# Patient Record
Sex: MALE | Race: WHITE
[De-identification: names, ages, dates, MRNs, and addresses within clinical notes are randomized per-mention and may not be internally consistent; named-entity substitution may affect disease eponyms.]

---

## 2017-03-26 ENCOUNTER — HOSPITAL ENCOUNTER (EMERGENCY)
Dept: HOSPITAL 25 - ED | Age: 62
Discharge: HOME | End: 2017-03-26
Payer: COMMERCIAL

## 2017-03-26 VITALS — SYSTOLIC BLOOD PRESSURE: 133 MMHG | DIASTOLIC BLOOD PRESSURE: 76 MMHG

## 2017-03-26 DIAGNOSIS — L03.115: Primary | ICD-10-CM

## 2017-03-26 LAB
ADD DIFF/SLIDE REVIEW?: (no result)
ANION GAP SERPL CALC-SCNC: 6 MMOL/L (ref 2–11)
BUN SERPL-MCNC: 16 MG/DL (ref 6–24)
BUN/CREAT SERPL: 21.6 (ref 8–20)
CALCIUM SERPL-MCNC: 9.3 MG/DL (ref 8.6–10.3)
CHLORIDE SERPL-SCNC: 102 MMOL/L (ref 101–111)
GLUCOSE SERPL-MCNC: 101 MG/DL (ref 70–100)
HCO3 SERPL-SCNC: 26 MMOL/L (ref 22–32)
HCT VFR BLD AUTO: 39 % (ref 42–52)
HGB BLD-MCNC: 13.2 G/DL (ref 14–18)
MCH RBC QN AUTO: 31 PG (ref 27–31)
MCHC RBC AUTO-ENTMCNC: 34 G/DL (ref 31–36)
MCV RBC AUTO: 91 FL (ref 80–94)
POTASSIUM SERPL-SCNC: 4.4 MMOL/L (ref 3.5–5)
RBC # BLD AUTO: 4.31 10^6/UL (ref 4–5.4)
SODIUM SERPL-SCNC: 134 MMOL/L (ref 133–145)
WBC # BLD AUTO: 12.2 10^3/UL (ref 3.5–10.8)

## 2017-03-26 PROCEDURE — 80048 BASIC METABOLIC PNL TOTAL CA: CPT

## 2017-03-26 PROCEDURE — 83605 ASSAY OF LACTIC ACID: CPT

## 2017-03-26 PROCEDURE — 36415 COLL VENOUS BLD VENIPUNCTURE: CPT

## 2017-03-26 PROCEDURE — 99284 EMERGENCY DEPT VISIT MOD MDM: CPT

## 2017-03-26 PROCEDURE — 85025 COMPLETE CBC W/AUTO DIFF WBC: CPT

## 2017-03-26 NOTE — ED
Lower Extremity





- HPI Summary


HPI Summary: 





62 male presents with complaints of redness, swelling, and pain of his right 

lower extremity that he noticed Monday 3/20 however got worse this past Friday 3

/24/17. Patient states he has been unable to comfortabley walk due to the pain. 

It has become hard for him to take more than 5 steps. He also complains of pain 

when hanging his leg over a chair or bed. He Denies any PMHx besides 

thyroidectomy and walking pneumonia ~1 month ago.. He denies any recent trauma 

or injury to his ankle. He has never had this before. He has been battling an 

infection on the bottom of his feet with his PCP that itches so bad he has 

scabbed wounds on he bottom of his feet. He does not know he name of the 

medication he was using on his feet. He is unsure if that caused an infection. 

Admits to recent travel back and forth to [place] in a car twice over this 

past Friday and Saturday. Denies prolonged bed rest and prior DVT. Resting 

makes the pain better. He tried taking ibuprofen without any relief. Denies 

fever/chills.





- History of Current Complaint


Chief Complaint: EDExtremityLower


Stated Complaint: RT ANKLE SWELLING/RED


Time Seen by Provider: 03/26/17 12:39


Hx Obtained From: Patient


Onset of Pain: Days


Severity Initially: Mild


Severity Currently: Mild


Pain Intensity: 2


Pain Scale Used: 0-10 Numeric


Timing: Intermittent


Location: Is Discrete @ - left lower extremity


Character Of Pain: Aching, Spasmodic, Burning


Associated Signs And Symptoms: Positive: Swelling, Redness


Aggravating Factor(s): Standing, Ambulation, Movement


Alleviating Factor(s): Rest, Elevation


Able to Bear Weight: Yes





- Risk Factors


Gout Risk Factors: Age Over 40


DVT Risk Factors: Recent Travel





- Allergies/Home Medications


Allergies/Adverse Reactions: 


 Allergies











Allergy/AdvReac Type Severity Reaction Status Date / Time


 


No Known Allergies Allergy   Verified 03/26/17 12:18











Home Medications: 


 Home Medications





Loratadine [Claritin 5 MG CHEW] 1 tab PO DAILY 03/26/17 [History Confirmed 03/26 /17]











PMH/Surg Hx/FS Hx/Imm Hx


Endocrine/Hematology History: Reports: Hx Thyroid Disease


   Denies: Hx Diabetes


Cardiovascular History: 


   Denies: Hx Hypertension


Respiratory History: 


   Denies: Hx Asthma





- Cancer History


Cancer Type, Location and Year: thyriod





- Surgical History


Surgery Procedure, Year, and Place: tonsillectomy, corrective eye surgery





- Immunization History


Immunizations Up to Date: Yes


Infectious Disease History: No


Infectious Disease History: 


   Denies: Traveled Outside the [place] in Last 30 Days





- Family History


Known Family History: Positive: Cardiac Disease





- Social History


Alcohol Use: Daily


Alcohol Amount: 5 beers a day


Substance Use Type: Reports: None


Smoking Status (MU): Never Smoked Tobacco





Review of Systems


Constitutional: Negative


Eyes: Negative


ENT: Negative


Cardiovascular: Negative


Respiratory: Negative


Gastrointestinal: Negative


Genitourinary: Negative


Positive: Arthralgia, Myalgia, Edema - LLE


Positive: Rash


Neurological: Negative


Psychological: Normal


All Other Systems Reviewed And Are Negative: Yes





Physical Exam


Triage Information Reviewed: Yes


Vital Signs On Initial Exam: 


 Initial Vitals











Temp Pulse Resp BP Pulse Ox


 


 99.5 F   105   16   143/81   100 


 


 03/26/17 12:13  03/26/17 12:13  03/26/17 12:13  03/26/17 12:13  03/26/17 12:13








tachycardia, fever noted.


Vital Signs Reviewed: Yes


Appearance: Positive: Well-Appearing, No Pain Distress, Well-Nourished


Skin: Positive: Warm, Skin Color Reflects Adequate Perfusion - < 2 second cap 

refill, Dry, Erythema @ - LLE and edematous, no discharge, skin is hot to touch 

and tight., Other - scabbed over wounds on bilateral bottom of feet. dry and 

excoriated..  Negative: Cold, Numb, Cyanosis @, Pale


Head/Face: Positive: Normal Head/Face Inspection


Eyes: Positive: Normal, Conjunctiva Clear


ENT: Positive: Normal ENT inspection, Hearing grossly normal


Neck: Positive: Supple, Nontender, No Lymphadenopathy


Respiratory/Lung Sounds: Positive: Clear to Auscultation, Breath Sounds Present


Cardiovascular: Positive: Normal, RRR, Pulses are Symmetrical in both Upper and 

Lower Extremities - 2+ pedal pulses


Abdomen Description: Positive: Nontender


Bowel Sounds: Positive: Present


Musculoskeletal: Positive: Normal, Strength/ROM Intact - sensation intact., 

Pain @ - LLE, Edema Right.  Negative: Limited @, Interruption @


Neurological: Positive: Normal, Sensory/Motor Intact, Alert, Oriented to Person 

Place, Time, CN Intact II-III, Reflexes Intact, NV Bundle Intact Distally, 

Normal Gait


Psychiatric: Positive: Normal, Affect/Mood Appropriate





Diagnostics





- Vital Signs


 Vital Signs











  Temp Pulse Resp BP Pulse Ox


 


 03/26/17 12:13  99.5 F  105  16  143/81  100














- Laboratory


Lab Results: 


 Lab Results











  03/26/17 Range/Units





  13:04 


 


WBC  12.2 H  (3.5-10.8)  10^3/ul


 


RBC  4.31  (4.0-5.4)  10^6/ul


 


Hgb  13.2 L  (14.0-18.0)  g/dl


 


Hct  39 L  (42-52)  %


 


MCV  91  (80-94)  fL


 


MCH  31  (27-31)  pg


 


MCHC  34  (31-36)  g/dl


 


RDW  14  (10.5-15)  %


 


Plt Count  324  (150-450)  10^3/ul


 


MPV  8  (7.4-10.4)  um3


 


Neut % (Auto)  69.9  (38-83)  %


 


Lymph % (Auto)  11.7 L  (25-47)  %


 


Mono % (Auto)  14.6 H  (1-9)  %


 


Eos % (Auto)  2.7  (0-6)  %


 


Baso % (Auto)  1.1  (0-2)  %


 


Absolute Neuts (auto)  8.5 H  (1.5-7.7)  10^3/ul


 


Absolute Lymphs (auto)  1.4  (1.0-4.8)  10^3/ul


 


Absolute Monos (auto)  1.8 H  (0-0.8)  10^3/ul


 


Absolute Eos (auto)  0.3  (0-0.6)  10^3/ul


 


Absolute Basos (auto)  0.1  (0-0.2)  10^3/ul


 


Absolute Nucleated RBC  0  10^3/ul


 


Nucleated RBC %  0  











Result Diagrams: 


 03/26/17 13:04





 03/26/17 13:04


Lab Statement: Any lab studies that have been ordered have been reviewed, and 

results considered in the medical decision making process.





- Radiology


  ** right ankle


Xray Interpretation: No Acute Changes - NO RADIOGRAPHICALLY APPARENT ACUTE 

ABNORMALITY OF THE RIGHT LOWER LEG OR ANKLE.


Radiology Interpretation Completed By: Radiologist





- Ultrasound


  ** No standard instances


Ultrasound Interpretation: No Acute Changes - No sonographic evidence of deep 

vein thrombosis.


Ultrasound Interpretation Completed By: Radiologist





Re-Evaluation





- Re-Evaluation


  ** First Eval


Re-Evaluation Time: 14:30


Change: Unchanged - patient was still feeling fine without pain and is in 

agreement with plan





Lower Extremity Course/Dx





- Course


Course Of Treatment: Was not in pain while laying in bed and denied pain 

management at this time. Labs obtained, slight WBC elevation. Fluids given. U/S 

and Xray of RLE negative. Appears to be suffering from cellulitis with chronic 

plantar foot wounds b/l being the possible source of infection. Given dose of 

Ancef while in ED. Continue keflex antibiotic at home x 10 days. Aware of 

worsening signs and symptoms.





- Diagnoses


Differential Diagnosis/HQI/PQRI: Positive: Cellulitis, DVT, Gout, Infection, 

Phlebitis, Septic Arthritis, Sprain, Strain


Provider Diagnoses: 


 Cellulitis of right lower extremity








- Physician Notifications


Discussed Care of Patient With: Dr Felix





Discharge





- Discharge Plan


Condition: Stable


Disposition: HOME


Prescriptions: 


Cephalexin CAP* [Keflex CAP*] 500 mg PO QID #40 cap


Patient Education Materials:  Cellulitis (ED)


Referrals: 


Session Ahsan SNYDER [Primary Care Provider] - 


Additional Instructions: 


Take prescribed antibiotics starting tomorrow for the next 10 days. Recommend 

eating greek yogurt or taking probiotic in between antibiotic doses.


Ibuprofen or Aleve to help with pain.


If redness, swelling or pain increases or fever/chills is over 104F after 48 

hours of taking medications please return or seek medical attention promptly.


You should see some improvement within 48 hours. 


Keep area clean and dry.


Discuss with your PCP and podiatry about foot condition to prevent future 

infection. 


Drink plenty of fluids.

## 2017-03-26 NOTE — RAD
INDICATION:  Erythema and pain at the right foot and distal leg



COMPARISON: None.



TECHNIQUE: 3 views of the right ankle were obtained.



FINDINGS: The bones are normal alignment. Joint spaces appear maintained. Degenerative

changes include enthesophyte formation at the calcaneal tubercle at the origin of the

plantar fascia. No fracture is seen.



IMPRESSION:  NO RADIOGRAPHICALLY APPARENT ACUTE ABNORMALITY OF THE RIGHT LOWER LEG OR

ANKLE.



If the patient's symptoms persist, follow-up imaging is recommended.

## 2017-03-26 NOTE — RAD
HISTORY: Right lower extremity pain and swelling



TECHNIQUE: Multiple transverse and longitudinal ultrasound images were obtained of the

veins of the right lower extremity using grayscale, color Doppler, and spectral Doppler

imaging with and without compression and with augmentation. 



FINDINGS:



VEINS: The common femoral vein, deep femoral vein, femoral vein and popliteal vein are

compressible throughout their course, with normal flow on color Doppler imaging and normal

response to augmentation on spectral Doppler imaging.



SOFT TISSUES: Grossly normal. No large popliteal fossa cyst was identified.



IMPRESSION: 

No sonographic evidence of deep vein thrombosis.

## 2020-03-07 ENCOUNTER — HOSPITAL ENCOUNTER (OUTPATIENT)
Dept: HOSPITAL 25 - ED | Age: 65
Setting detail: OBSERVATION
LOS: 2 days | Discharge: HOME | End: 2020-03-09
Attending: INTERNAL MEDICINE | Admitting: INTERNAL MEDICINE
Payer: SELF-PAY

## 2020-03-07 DIAGNOSIS — D64.9: ICD-10-CM

## 2020-03-07 DIAGNOSIS — J96.01: ICD-10-CM

## 2020-03-07 DIAGNOSIS — Z79.899: ICD-10-CM

## 2020-03-07 DIAGNOSIS — Z85.850: ICD-10-CM

## 2020-03-07 DIAGNOSIS — E89.0: ICD-10-CM

## 2020-03-07 DIAGNOSIS — R06.02: ICD-10-CM

## 2020-03-07 DIAGNOSIS — J18.9: ICD-10-CM

## 2020-03-07 DIAGNOSIS — A41.9: Primary | ICD-10-CM

## 2020-03-07 DIAGNOSIS — Z23: ICD-10-CM

## 2020-03-07 DIAGNOSIS — F10.10: ICD-10-CM

## 2020-03-07 DIAGNOSIS — R94.6: ICD-10-CM

## 2020-03-07 LAB
ALBUMIN SERPL BCG-MCNC: 4 G/DL (ref 3.2–5.2)
ALBUMIN/GLOB SERPL: 1.3 {RATIO} (ref 1–3)
ALP SERPL-CCNC: 117 U/L (ref 34–104)
ALT SERPL W P-5'-P-CCNC: 53 U/L (ref 7–52)
ANION GAP SERPL CALC-SCNC: 9 MMOL/L (ref 2–11)
AST SERPL-CCNC: 29 U/L (ref 13–39)
BASOPHILS # BLD AUTO: 0.1 10^3/UL (ref 0–0.2)
BUN SERPL-MCNC: 13 MG/DL (ref 6–24)
BUN/CREAT SERPL: 14.3 (ref 8–20)
CALCIUM SERPL-MCNC: 9.4 MG/DL (ref 8.6–10.3)
CHLORIDE SERPL-SCNC: 102 MMOL/L (ref 101–111)
EOSINOPHIL # BLD AUTO: 0.5 10^3/UL (ref 0–0.6)
GLOBULIN SER CALC-MCNC: 3.2 G/DL (ref 2–4)
GLUCOSE SERPL-MCNC: 97 MG/DL (ref 70–100)
HCO3 SERPL-SCNC: 25 MMOL/L (ref 22–32)
HCT VFR BLD AUTO: 38 % (ref 42–52)
HGB BLD-MCNC: 13 G/DL (ref 14–18)
LYMPHOCYTES # BLD AUTO: 0.9 10^3/UL (ref 1–4.8)
MCH RBC QN AUTO: 32 PG (ref 27–31)
MCHC RBC AUTO-ENTMCNC: 34 G/DL (ref 31–36)
MCV RBC AUTO: 94 FL (ref 80–94)
MONOCYTES # BLD AUTO: 1.2 10^3/UL (ref 0–0.8)
NEUTROPHILS # BLD AUTO: 12.9 10^3/UL (ref 1.5–7.7)
NRBC # BLD AUTO: 0 10^3/UL
NRBC BLD QL AUTO: 0
PLATELET # BLD AUTO: 482 10^3/UL (ref 150–450)
POTASSIUM SERPL-SCNC: 4.3 MMOL/L (ref 3.5–5)
PROT SERPL-MCNC: 7.2 G/DL (ref 6.4–8.9)
RBC # BLD AUTO: 4.07 10^6 /UL (ref 4.18–5.48)
SODIUM SERPL-SCNC: 136 MMOL/L (ref 135–145)
TROPONIN I SERPL-MCNC: 0.01 NG/ML (ref ?–0.03)
TSH SERPL-ACNC: 0.02 MCIU/ML (ref 0.34–5.6)
WBC # BLD AUTO: 15.7 10^3/UL (ref 3.5–10.8)

## 2020-03-07 PROCEDURE — 87205 SMEAR GRAM STAIN: CPT

## 2020-03-07 PROCEDURE — 96361 HYDRATE IV INFUSION ADD-ON: CPT

## 2020-03-07 PROCEDURE — 83880 ASSAY OF NATRIURETIC PEPTIDE: CPT

## 2020-03-07 PROCEDURE — 36415 COLL VENOUS BLD VENIPUNCTURE: CPT

## 2020-03-07 PROCEDURE — 99283 EMERGENCY DEPT VISIT LOW MDM: CPT

## 2020-03-07 PROCEDURE — 84439 ASSAY OF FREE THYROXINE: CPT

## 2020-03-07 PROCEDURE — G0378 HOSPITAL OBSERVATION PER HR: HCPCS

## 2020-03-07 PROCEDURE — 82728 ASSAY OF FERRITIN: CPT

## 2020-03-07 PROCEDURE — 82607 VITAMIN B-12: CPT

## 2020-03-07 PROCEDURE — 96372 THER/PROPH/DIAG INJ SC/IM: CPT

## 2020-03-07 PROCEDURE — 71046 X-RAY EXAM CHEST 2 VIEWS: CPT

## 2020-03-07 PROCEDURE — 82272 OCCULT BLD FECES 1-3 TESTS: CPT

## 2020-03-07 PROCEDURE — 83540 ASSAY OF IRON: CPT

## 2020-03-07 PROCEDURE — 84466 ASSAY OF TRANSFERRIN: CPT

## 2020-03-07 PROCEDURE — 82746 ASSAY OF FOLIC ACID SERUM: CPT

## 2020-03-07 PROCEDURE — G0008 ADMIN INFLUENZA VIRUS VAC: HCPCS

## 2020-03-07 PROCEDURE — 86140 C-REACTIVE PROTEIN: CPT

## 2020-03-07 PROCEDURE — 80048 BASIC METABOLIC PNL TOTAL CA: CPT

## 2020-03-07 PROCEDURE — 83550 IRON BINDING TEST: CPT

## 2020-03-07 PROCEDURE — 71260 CT THORAX DX C+: CPT

## 2020-03-07 PROCEDURE — 87899 AGENT NOS ASSAY W/OPTIC: CPT

## 2020-03-07 PROCEDURE — 90686 IIV4 VACC NO PRSV 0.5 ML IM: CPT

## 2020-03-07 PROCEDURE — 84443 ASSAY THYROID STIM HORMONE: CPT

## 2020-03-07 PROCEDURE — 84484 ASSAY OF TROPONIN QUANT: CPT

## 2020-03-07 PROCEDURE — 80053 COMPREHEN METABOLIC PANEL: CPT

## 2020-03-07 PROCEDURE — 96366 THER/PROPH/DIAG IV INF ADDON: CPT

## 2020-03-07 PROCEDURE — 96375 TX/PRO/DX INJ NEW DRUG ADDON: CPT

## 2020-03-07 PROCEDURE — 85379 FIBRIN DEGRADATION QUANT: CPT

## 2020-03-07 PROCEDURE — 96367 TX/PROPH/DG ADDL SEQ IV INF: CPT

## 2020-03-07 PROCEDURE — 83735 ASSAY OF MAGNESIUM: CPT

## 2020-03-07 PROCEDURE — 85025 COMPLETE CBC W/AUTO DIFF WBC: CPT

## 2020-03-07 PROCEDURE — 90471 IMMUNIZATION ADMIN: CPT

## 2020-03-07 PROCEDURE — 96365 THER/PROPH/DIAG IV INF INIT: CPT

## 2020-03-07 PROCEDURE — 87070 CULTURE OTHR SPECIMN AEROBIC: CPT

## 2020-03-07 PROCEDURE — 93005 ELECTROCARDIOGRAM TRACING: CPT

## 2020-03-07 RX ADMIN — GUAIFENESIN SCH MG: 600 TABLET, EXTENDED RELEASE ORAL at 20:08

## 2020-03-07 RX ADMIN — ENOXAPARIN SODIUM SCH MG: 40 INJECTION SUBCUTANEOUS at 16:23

## 2020-03-07 RX ADMIN — LEVOFLOXACIN SCH MLS/HR: 5 INJECTION, SOLUTION INTRAVENOUS at 13:29
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
## 2020-03-07 NOTE — ED
Respiratory





- HPI Summary


HPI Summary: 


64 year old M with hx pneumonia arriving via private car to [facility] complains of 

increasing shortness of breath and cough worse with exertion since mid Feb 2020. Patient started feeling sick mid Feb. He has been going to his primary 

care provider. Dx bronchitis and walking pneumonia 2/17. Prescribed 10 day 

course of antibiotics after which he was not feeling any better. He went back 

to his primary care provider who prescribed another course of antibiotics which 

he finished 2 days ago. He is still not feeling better. He is unable to take a 

full breath. Patient reports light headedness and non-radiating chest pain 

aggravated by palpation. Patient denies wheezing, pain or swelling in BLE, 

abdominal pain. He had CXR done earlier this week done and hasn't heard about 

results yet. He also had EKG done earlier this week which was normal. The 

patient rates the pain 4/10 in severity. Symptoms alleviated by nothing. 

Medications reviewed. No hx asthma, COPD, cardiac, DVT, PE. Patient is a non-

smoker. He is around his girlfriend who smokes a lot.





- History of Current Complaint


Chief Complaint: EDUpperRespComplaint


Stated Complaint: SOB/BREATHING PROBLEMS PER PT


Time Seen by Provider: 03/07/20 10:26


Hx Obtained From: Patient


Onset/Duration: Lasting Weeks - mid Feb 2020, Still Present


Timing: Constant


Current Severity: Mild


Pain Intensity: 4


Aggravating Factor(s): Exertion


Alleviating Factor(s): Nothing





- Allergy/Home Medications


Allergies/Adverse Reactions: 


 Allergies











Allergy/AdvReac Type Severity Reaction Status Date / Time


 


No Known Allergies Allergy   Verified 03/26/17 12:18











Home Medications: 


 Home Medications





Levothyroxine 150 mcg PO DAILY 01/08/15 [History Confirmed 03/07/20]











PMH/Surg Hx/FS Hx/Imm Hx


Endocrine/Hematology History: Reports: Hx Thyroid Disease


   Denies: Hx Diabetes


Cardiovascular History: 


   Denies: Hx Deep Vein Thrombosis, Hx Hypertension


Respiratory History: Reports: Hx Pneumonia


   Denies: Hx Asthma, Hx Chronic Obstructive Pulmonary Disease (COPD), Hx 

Pulmonary Edema





- Cancer History


Cancer Type, Location and Year: thyriod





- Surgical History


Surgery Procedure, Year, and Place: tonsillectomy, corrective eye surgery


Infectious Disease History: No


Infectious Disease History: 


   Denies: Traveled Outside the [place] in Last 30 Days





- Family History


Known Family History: Positive: Cardiac Disease





- Social History


Alcohol Use: Daily


Alcohol Amount: 5 beers a day


Substance Use Type: Reports: None


Hx Tobacco Use: No


Smoking Status (MU): Never Smoked Tobacco





Review of Systems


Positive: Chest Pain


Respiratory: Negative - wheezing


Positive: Shortness Of Breath, Cough


Negative: Abdominal Pain


Musculoskeletal: Negative - pain or swelling in BLE


Neurological/Mental Status: Other - light headedness


All Other Systems Reviewed And Are Negative: Yes





Physical Exam





- Summary


Physical Exam Summary: 





Constitutional: Well-developed, Well-nourished, Alert. (-) Distressed


Skin: Warm, Dry


HENT: Normocephalic; Atraumatic


Eyes: Conjunctiva normal


Neck: Musculoskeletal ROM normal neck. (-) JVD, (-) Stridor, (-) Tracheal 

deviation


Cardio: Rhythm regular, rate normal, Heart sounds normal; Intact distal pulses; 

The pedal pulses are 2+ and symmetric. Radial pulses are 2+ and symmetric. (-) 

Murmur


Pulmonary/Chest wall: Effort normal. (-) Respiratory distress, (-) Wheezes, (-) 

Rales


Abd: Soft, (-) tenderness, (-) Distension, (-) Guarding, (-) Rebound


Musculoskeletal: (-) Edema


Lymph: (-) Cervical adenopathy


Neuro: Alert, Oriented x3


Psych: Mood and affect Normal





Triage Information Reviewed: Yes


Vital Signs On Initial Exam: 


 Initial Vitals











Temp Pulse Resp BP Pulse Ox


 


 98.7 F   94   20   126/91   92 


 


 03/07/20 10:19  03/07/20 10:19  03/07/20 10:19  03/07/20 10:19  03/07/20 10:19











Vital Signs Reviewed: Yes





Procedures





- Sedation


Patient Received Moderate/Deep Sedation with Procedure: No





Diagnostics





- Vital Signs


 Vital Signs











  Temp Pulse Resp BP Pulse Ox


 


 03/07/20 10:19  98.7 F  94  20  126/91  92














- Laboratory


Result Diagrams: 


 03/07/20 10:46





 03/07/20 10:46


Lab Statement: Any lab studies that have been ordered have been reviewed, and 

results considered in the medical decision making process.





- Radiology


  ** CXR


Radiology Interpretation Completed By: Radiologist - IMPRESSION: #.  Suboptimal 

inspiration with resulting crowding of the pulmonary markings based on 

comparison with the prior exam. No compelling evidence for pneumonia. ED 

physician has reviewed this imaging report.





- CT


  ** CHEST


CT Interpretation Completed By: Radiologist - IMPRESSION:  #. The constellation 

of findings favors mild bronchopneumonia.  #.Upper normal 1.0 cm short axis 

precarinal lymph node and similar RIGHT suprahilar lymph node enlargement 

compared with the 2015 CT. ED physician has reviewed this imaging report.





- EKG


  ** 1043


Cardiac Rate: NL - 84 BPM


EKG Rhythm: Sinus Rhythm


Summary of EKG Findings: No ischemic changes. ED physician has reviewed and 

interpreted this EKG.





Disposition





- Course


Course Of Treatment: 65 y/o M with recent dx bronchitis and pneumonia presents 

with increasing shortness of breath and cough worse with exertion since mid Feb 2020. He has been going to his primary care provider who has prescribed him 2 

courses of antibiotics. Patient isn't feeling better and is unable to take a 

full breath. He reports light headedness and non-radiating chest pain 

aggravated by palpation. He had CXR and EKG done earlier this week. Patient is 

a non-smoker. He is around his girlfriend who smokes a lot.  Physical exam 

unremarkable.  Bloodwork results with no significant abnormalities except for 

WBC 15.7, RBC 4.07, Hgb 13.0, MCH 32, platelet 482, absolute neuts 12.9, 

absolute lymphs 0.9, absolute monos 1.2, ALT 53, alkaline phosphatase 117.  An 

EKG shows NSR 84 BPM and no ischemic changes.  CXR shows Suboptimal inspiration 

with resulting crowding of the pulmonary markings based on comparison with the 

prior exam. No compelling evidence for pneumonia.  Chest CT shows #. The 

constellation of findings favors mild bronchopneumonia.  #.Upper normal 1.0 cm 

short axis precarinal lymph node and similar RIGHT suprahilar lymph node 

enlargement compared with the 2015 CT.  In the ED course, the patient was given 

Duoneb, ceftriaxone.  Spoke to Dr. Leon. She agrees to admit patient.





- Diagnoses


Provider Diagnoses: 


 Bronchopneumonia, Hypoxia








- Physician Notifications


Discussed Care Of Patient With: Mallory Leon


Time Discussed With Above Provider: 13:10


Instructed by Provider To: Admit As Inpatient





Discharge ED





- Sign-Out/Discharge


Documenting (check all that apply): Patient Departure





- Discharge Plan


Condition: Stable


Disposition: ADMITTED TO [facility]


Referrals: 


Ahsan Townsend PA [Primary Care Provider] - 





- Billing Disposition and Condition


Condition: STABLE


Disposition: Admitted to [facility]





- Attestation Statements


Document Initiated by Chiomaibomid: Yes


Documenting Scribe: Fany Sam


Provider For Whom Dana is Documenting (Include Credential): Eduin White DO


Scribe Attestation: 


IFany, scribed for Eduin White DO on 03/07/20 at 1347. 


Scribe Documentation Reviewed: Yes


Provider Attestation: 


The documentation as recorded by the chiomaibFany anne accurately reflects 

the service I personally performed and the decisions made by me, Eduin White DO


Status of Scribe Document: Viewed

## 2020-03-08 LAB
ANION GAP SERPL CALC-SCNC: 7 MMOL/L (ref 2–11)
BASOPHILS # BLD AUTO: 0 10^3/UL (ref 0–0.2)
BUN SERPL-MCNC: 13 MG/DL (ref 6–24)
BUN/CREAT SERPL: 16 (ref 8–20)
CALCIUM SERPL-MCNC: 8.7 MG/DL (ref 8.6–10.3)
CHLORIDE SERPL-SCNC: 102 MMOL/L (ref 101–111)
EOSINOPHIL # BLD AUTO: 0.7 10^3/UL (ref 0–0.6)
FERRITIN SERPL IA-MCNC: 324.5 NG/ML (ref 24–336)
FOLATE SERPL-MCNC: 14.38 NG/ML (ref 3.99–?)
GLUCOSE SERPL-MCNC: 110 MG/DL (ref 70–100)
HCO3 SERPL-SCNC: 24 MMOL/L (ref 22–32)
HCT VFR BLD AUTO: 34 % (ref 42–52)
HGB BLD-MCNC: 11.7 G/DL (ref 14–18)
IRON SERPL-MCNC: 64 UG/DL (ref 50–212)
LYMPHOCYTES # BLD AUTO: 1 10^3/UL (ref 1–4.8)
MAGNESIUM SERPL-MCNC: 2 MG/DL (ref 1.9–2.7)
MCH RBC QN AUTO: 32 PG (ref 27–31)
MCHC RBC AUTO-ENTMCNC: 34 G/DL (ref 31–36)
MCV RBC AUTO: 93 FL (ref 80–94)
MONOCYTES # BLD AUTO: 0.9 10^3/UL (ref 0–0.8)
NEUTROPHILS # BLD AUTO: 5.8 10^3/UL (ref 1.5–7.7)
NRBC # BLD AUTO: 0 10^3/UL
NRBC BLD QL AUTO: 0
PLATELET # BLD AUTO: 406 10^3/UL (ref 150–450)
POTASSIUM SERPL-SCNC: 4 MMOL/L (ref 3.5–5)
RBC # BLD AUTO: 3.67 10^6 /UL (ref 4.18–5.48)
SODIUM SERPL-SCNC: 133 MMOL/L (ref 135–145)
TIBC SERPL-MCNC: 256 MCG/DL (ref 250–450)
TRANSFERRIN SERPL-MCNC: 183 MG/DL (ref 203–362)
WBC # BLD AUTO: 8.5 10^3/UL (ref 3.5–10.8)

## 2020-03-08 RX ADMIN — LEVOFLOXACIN SCH MLS/HR: 5 INJECTION, SOLUTION INTRAVENOUS at 13:29

## 2020-03-08 RX ADMIN — THERA TABS SCH TAB: TAB at 09:22

## 2020-03-08 RX ADMIN — FOLIC ACID SCH MG: 1 TABLET ORAL at 09:22

## 2020-03-08 RX ADMIN — GUAIFENESIN SCH MG: 600 TABLET, EXTENDED RELEASE ORAL at 07:19

## 2020-03-08 RX ADMIN — ENOXAPARIN SODIUM SCH MG: 40 INJECTION SUBCUTANEOUS at 13:31

## 2020-03-08 RX ADMIN — GUAIFENESIN SCH MG: 600 TABLET, EXTENDED RELEASE ORAL at 20:10

## 2020-03-08 NOTE — PN
Subjective


Date of Service: 03/08/20


Interval History: 





Patient is feeling better than he did yesterday. Still mildly short of breath 

at rest. Frequently coughing, bringing up thick dark brown sputum. Using 

incentive spirometer. Titrated from 5 to 3.5L via nasal cannula. Reports 

drinking 4-6 alcoholic drinks a day. Denies lightheadedness, dizziness, chest 

pain, palpitations, abdominal pain, nausea, vomiting, issues moving bowel or 

bladder. 


Family History: Unchanged from Admission


Social History: Unchanged from Admission


Past Medical History: Unchanged from Admission





Objective


Active Medications: 








Acetaminophen (Tylenol Tab*)  650 mg PO Q6H PRN


   PRN Reason: MILD PAIN or TEMP > 100.4


Albuterol/Ipratropium (Duoneb (Albuterol 2.5 Mg/Ipratropium 0.5 Mg))  1 neb INH 

Q6H PRN


   PRN Reason: SOB/WHEEZING


Benzonatate (Tessalon Cap*)  100 mg PO BID PRN


   PRN Reason: COUGH


   Last Admin: 03/08/20 06:00 Dose:  100 mg


Enoxaparin Sodium (Lovenox(*))  40 mg SUBCUT Q24H [facility]


   Last Admin: 03/07/20 16:23 Dose:  40 mg


Guaifenesin (Mucinex*)  1,200 mg PO BID [facility]


   Last Admin: 03/08/20 07:19 Dose:  1,200 mg


Levofloxacin/Dextrose (Levaquin 750 Mg Ivpremix(*))  750 mg in 150 mls @ 100 mls

/hr IVPB Q24H [facility]; Protocol


   Last Admin: 03/07/20 13:29 Dose:  100 mls/hr


Sodium Chloride (Ns 0.9% 1000 Ml**)  1,000 mls @ 100 mls/hr IV PER RATE [facility]


   Stop: 03/08/20 18:29


Influenza Virus Vaccine (Fluarix Quad [id number] Syr)  0.5 ml IM .ONCE ONE


   Stop: 03/08/20 09:01


   Last Admin: 03/08/20 07:19 Dose:  0.5 ml


Levothyroxine Sodium (Synthroid Tab*)  100 mcg PO 0600 [facility]


Melatonin (Melatonin)  3 mg PO BEDTIME PRN


   PRN Reason: INSOMNIA


   Last Admin: 03/07/20 20:08 Dose:  3 mg


Ondansetron HCl (Zofran Inj*)  4 mg IV Q6H PRN


   PRN Reason: NAUSEA








 Vital Signs - 8 hr











  03/08/20 03/08/20





  03:15 07:19


 


Temperature 99.7 F 99.4 F


 


Pulse Rate 78 82


 


Respiratory 16 20





Rate  


 


Blood Pressure 92/55 102/62





(mmHg)  


 


O2 Sat by Pulse 95 96





Oximetry  











Oxygen Devices in Use Now: Nasal Cannula - 3.5L, OxyMask


Appearance: This is a well developed gentleman seen sitting up in bed, no acute 

distress.


Eyes: No Scleral Icterus, PERRLA


Ears/Nose/Mouth/Throat: NL Teeth, Lips, Gums, Clear Oropharnyx, Mucous 

Membranes Moist


Neck: NL Appearance and Movements; NL JVP, Trachea Midline


Respiratory: Symmetrical Chest Expansion and Respiratory Effort, - - Fine 

crackles to right lower and middle lobes.


Cardiovascular: NL Sounds; No Murmurs; No JVD, RRR, No Edema


Abdominal: NL Sounds; No Tenderness; No Distention


Lymphatic: No Cervical Adenopathy


Extremities: No Edema, No Clubbing, Cyanosis


Skin: No Rash or Ulcers, No Nodules or Sclerosis


Neurological: Alert and Oriented x 3


Lines/Tubes/Other Access: Clean, Dry and Intact Peripheral IV


Result Diagrams: 


 03/08/20 05:25





 03/08/20 05:25


Microbiology and Other Data: 


 Microbiology











 03/07/20 18:00 Gram Stain - Final





 Sputum 


 


 03/07/20 18:00 Legionella Urinary Antigen - Final





 Urine    Negative Legionella Antigen





 Streptococcus pneumoniae Ag Screen - Final





    Negative S. pneumo Antigen














Assess/Plan/Problems-Billing


Assessment: 





This is a 65yo male with a past medical history of thyroid cancer, s/p 

thyroidectomy, and ETOH abuse who was admitted 3/7/20 for acute hypoxic 

respiratory failure secondary to bacerial bronchopneumonia.





- Patient Problems


(1) Acute respiratory failure with hypoxia


Current Visit: Yes   Status: Acute   Code(s): J96.01 - ACUTE RESPIRATORY 

FAILURE WITH HYPOXIA   SNOMED Code(s): 89716568


   Comment: -Originally was on 5L via face mask. Was able to be tirated to 3.5L 

via nasal cannula this morning. Continue to titrate oxygen to keep sats above 90

%. 


-This is secondary to bronchopneumonia.   





(2) Sepsis


Current Visit: Yes   Status: Acute   Comment: -resolved.   





(3) Bronchopneumonia


Current Visit: Yes   Status: Acute   Code(s): J18.0 - BRONCHOPNEUMONIA, 

UNSPECIFIED ORGANISM   SNOMED Code(s): 205761875


   Comment: -Awaiting sputum culture. Smear shows gram positive and negative 

coccobacilli. 


-Continue levaquin. Recheck labs in am. No tendon pain.   





(4) Hypothyroid


Current Visit: Yes   Status: Acute   Code(s): E03.9 - HYPOTHYROIDISM, 

UNSPECIFIED   SNOMED Code(s): 87742626


   Comment: -Labs revealed hyperthyroidism. Home dosage of levothyroxine was 

150mcg, which has been decreased to 125mcg. Appears to be fidgety and slightly 

anxious but otherwise asymptomatic.   





(5) ETOH abuse


Current Visit: Yes   Status: Acute   Code(s): F10.10 - ALCOHOL ABUSE, 

UNCOMPLICATED   SNOMED Code(s): 88107155


   Comment: -Drinks 4-6 alcoholic drinks daily. Placed on WA protocol with 

pascual thiamine, folic acid and multivitamin.   





(6) Anemia


Current Visit: Yes   Status: Acute   Code(s): D64.9 - ANEMIA, UNSPECIFIED   

SNOMED Code(s): 836376730


   Comment: -Unsure of origin at this time, though I suspect it is related to 

his drinking. He reports waking up many morning with a nosebleed, though it is 

only a couple of spots each time. Matteo never soaked a kleenex. 


-Ordered Vit B12, folate and iron studies.   





(7) DVT prophylaxis


Current Visit: Yes   Status: Acute   Code(s): Z29.9 - ENCOUNTER FOR 

PROPHYLACTIC MEASURES, UNSPECIFIED   SNOMED Code(s): 575956101


   Comment: -Continue lovenox.   





(8) Full code status


Current Visit: Yes   Status: Acute   Code(s): Z78.9 - OTHER SPECIFIED HEALTH 

STATUS   SNOMED Code(s): 499000977


   


Status and Disposition: 





Condition: fair


Dispo: Admit OBV to [facility]


Attending: Caroline Armenta

## 2020-03-09 VITALS — DIASTOLIC BLOOD PRESSURE: 76 MMHG | SYSTOLIC BLOOD PRESSURE: 118 MMHG

## 2020-03-09 LAB
ANION GAP SERPL CALC-SCNC: 7 MMOL/L (ref 2–11)
BASOPHILS # BLD AUTO: 0 10^3/UL (ref 0–0.2)
BUN SERPL-MCNC: 11 MG/DL (ref 6–24)
BUN/CREAT SERPL: 14.5 (ref 8–20)
CALCIUM SERPL-MCNC: 8.8 MG/DL (ref 8.6–10.3)
CHLORIDE SERPL-SCNC: 106 MMOL/L (ref 101–111)
EOSINOPHIL # BLD AUTO: 0.7 10^3/UL (ref 0–0.6)
GLUCOSE SERPL-MCNC: 102 MG/DL (ref 70–100)
HCO3 SERPL-SCNC: 22 MMOL/L (ref 22–32)
HCT VFR BLD AUTO: 35 % (ref 42–52)
HGB BLD-MCNC: 12 G/DL (ref 14–18)
LYMPHOCYTES # BLD AUTO: 1 10^3/UL (ref 1–4.8)
MCH RBC QN AUTO: 32 PG (ref 27–31)
MCHC RBC AUTO-ENTMCNC: 34 G/DL (ref 31–36)
MCV RBC AUTO: 92 FL (ref 80–94)
MONOCYTES # BLD AUTO: 1.1 10^3/UL (ref 0–0.8)
NEUTROPHILS # BLD AUTO: 6.5 10^3/UL (ref 1.5–7.7)
NRBC # BLD AUTO: 0 10^3/UL
NRBC BLD QL AUTO: 0
PLATELET # BLD AUTO: 411 10^3/UL (ref 150–450)
POTASSIUM SERPL-SCNC: 4.1 MMOL/L (ref 3.5–5)
RBC # BLD AUTO: 3.77 10^6 /UL (ref 4.18–5.48)
SODIUM SERPL-SCNC: 135 MMOL/L (ref 135–145)
WBC # BLD AUTO: 9.3 10^3/UL (ref 3.5–10.8)

## 2020-03-09 RX ADMIN — THERA TABS SCH TAB: TAB at 10:08

## 2020-03-09 RX ADMIN — GUAIFENESIN SCH MG: 600 TABLET, EXTENDED RELEASE ORAL at 10:08

## 2020-03-09 RX ADMIN — FOLIC ACID SCH MG: 1 TABLET ORAL at 10:08

## 2020-03-10 NOTE — DS
CC:  CLAUDIO Hoff *

 

DISCHARGE SUMMARY:

 

DATE OF ADMISSION:  03/07/20

 

DATE OF DISCHARGE:  03/09/20

 

PRIMARY CARE PROVIDER:  CLAUDIO Hoff

 

ATTENDING PHYSICIAN:  Dr. Caroline Armenta * (dictated by DELROY Dalton).

 

PRIMARY DIAGNOSES:

1.  Acute respiratory failure with hypoxia, resolved.

2.  Sepsis, resolved.

3.  Pneumonia.

4.  Elevated free T4.

5.  Anemia suspected to be due to alcohol abuse.

 

SECONDARY DIAGNOSES:

1.  Hypothyroidism.

2.  Thyroid cancer, status post thyroidectomy resulting in hypothyroidism.

 

STUDIES WHILE IN THE HOSPITAL:

1.  Two view chest x-ray, impression:  Suboptimal inspiration with resultant 
crowding of the pulmonary markings based on comparison with the prior exam.  No 
compelling evidence for pneumonia.

2.  CT chest with contrast, impression: The constellation of findings favors 
mild bronchopneumonia, upper normal 1.0 cm short axis, precarinal lymph node 
and similar right suprahilar lymph node enlargement compared with 2015 CT.

 

DISCHARGE MEDICATIONS:  Home medications:  None.

 

New home medications:

1.  Benzonatate 100 mg p.o. b.i.d. p.r.n. cough.

2.  Folic acid 1 mg p.o. daily.

3.  Guaifenesin 1200 mg p.o. b.i.d. p.r.n. chest congestion.

4.  Levofloxacin 750 mg p.o. daily x7 days (total of 10 days of treatment).

5.  Melatonin 3 mg p.o. at bedtime p.r.n.

6.  Multivitamin/minerals 1 tab p.o. daily.

7.  Thiamine 100 mg p.o. daily.

 

Changed home medications:  

1.  Levothyroxine decreased to 125 mcg p.o. daily.

 

HISTORY OF PRESENT ILLNESS/HOSPITAL COURSE:  Mr. Llanos is a 64-year-old male 
with a past medical history of hypothyroidism, status post thyroidectomy due to 
cancer, who presented to the ER on 03/07/20 with complaints of shortness of 
breath for approximately 3 weeks.  He reports that he had seen his primary care 
provider and was prescribed amoxicillin, azithromycin and prednisone taper, 
none of which have resolved his problems.  A chest x-ray was unremarkable, but 
a CT of the chest revealed bronchopneumonia with bilateral lower lobe ground-
glass opacities.  The patient was admitted to the ER and placed on Levaquin.  
It is important to note that at admission, the patient met sepsis criteria with 
leukocytosis and tachycardia.  Again, the patient was treated with Levaquin 
daily.  He initially required oxygen and was started on 5 L of oxygen, but 
eventually weaned down to home oxygen.  He did improve throughout his stay.  At 
rest and with ambulation, the patient's oxygen saturation remained intact and 
he denied dyspnea on exertion.  By the time of discharge, the patient reports 
that he is feeling well and is eager for discharge home.  Recommendations were 
made to continue Levaquin for a total of 10 days of antibiotic therapy.

 

The patient was noted to have a low TSH at admission, which prompted free T4 
analysis, which was noted to be somewhat elevated at 1.30.  The patient's home 
dose of levothyroxine 150 mcg was decreased to 125 mcg.  He should follow with 
his primary care provider for further adjustments with recommendation to repeat 
TSH and free T4 in 4 to 6 weeks.  The patient also had a mild anemia throughout 
his stay. Iron studies, B12 and folate were obtained and were all relatively 
unremarkable. He has a normocytic anemia suspected to be due to alcoholic use, 
although the patient should follow up with his primary care provider for 
further recommendation and continued monitoring.

 

At the time of discharge, the patient denies chest pain, shortness of breath, 
wheeze, fever, chills.  He denies dyspnea on exertion or lower extremity edema.
  He does continue to have a productive cough, but notes that this is 
decreased.  He denies abdominal pain, nausea, vomiting, diarrhea, constipation, 
myalgias, arthralgias.  Mr. Llanos is stable for discharge home.

 

PHYSICAL EXAMINATION:  Vital Signs:  Temperature 98.3 temporal, heart rate 90, 
respiratory rate 21, oxygen saturation 93% on room air, blood pressure 118/76. 
General:  Mr. Llanos is a well-developed, well-nourished, middle-aged white 
male, who is sitting at the edge of bed, eating breakfast.  He appears to be in 
no acute distress.  He is pleasant, cooperative, and appropriate.  He is 
breathing comfortably on room air.  HEENT:  PERRL.  EOMI.  Nonicteric sclerae.  
Hearing is grossly intact.  Oral mucous membranes are moist.  There are no 
lesions. Oropharynx is clear.  Tongue is at midline.  Palate elevates 
symmetrically.  No cervical lymphadenopathy.  Thyroid is nonpalpable.  
Cardiovascular:  Regular rate and rhythm.  S1, S2 present.  No murmurs, rubs, 
clicks, or gallops.  There is no JVD or peripheral edema.  Pulmonary:  
Symmetrical chest expansion without use of accessory muscles.  Lungs are clear 
to auscultation bilaterally without rhonchi, wheeze, or rales.  Abdomen:  Bowel 
sounds in all quadrants, soft, nontender to palpation.  Neuro:  The patient is 
awake.  He is alert and oriented x3.  Cranial nerves II through XII are grossly 
intact.  He is able to move all of his extremities with a motor strength to 5/5 
bilaterally in upper and lower extremities.  Skin:  Clean, dry and intact 
without rash.

 

DISCHARGE PLAN:  Mr. Llanos will be discharged to home.

 

CONDITION:  Good.

 

DIET:  Resume home diet.

 

ACTIVITY:  As tolerated.

 

MEDICATIONS:

1.  Levaquin 750 mg p.o. daily at 1330 total of 10 days.

2.  Continue Tessalon Perles for cough, Mucinex for chest congestion as needed.

3.  Continue folic acid, thiamine, and multivitamin.

4.  Levothyroxine dose has been decreased, please discard old dose and pickup 
new dose from your pharmacy.

 

EDUCATION:

1.  Follow up with primary care provider in 4 to 7 days, discuss recent 
hospitalization.

2.  Recommend repeat TSH, free T4 in 4 to 6 weeks to assess levothyroxine dose 
adjustment.

3.  Return to the ER or nearest hospital if you experience any return or 
worsening of symptoms, chest pain or discomfort, shortness of breath, high 
fevers, chills, night sweats, dizziness, lightheadedness, loss of consciousness
, or any other worrisome signs or symptoms.

 

This is a summarized report of a complex medical history and hospital stay.  
For further details, please see the entire medical record.

 

TIME SPENT:  Approximately 35 minutes was spent on this discharge, greater than 
half that time was spent face-to-face with the patient discussing discharge 
plans and instructions.

 

____________________________________ DELROY CORONADO

 

[id number]/CPS #: [id number]

MTDD

## 2020-03-12 ENCOUNTER — HOSPITAL ENCOUNTER (INPATIENT)
Dept: HOSPITAL 25 - ED | Age: 65
LOS: 3 days | Discharge: HOME | DRG: 193 | End: 2020-03-15
Attending: INTERNAL MEDICINE | Admitting: INTERNAL MEDICINE
Payer: SELF-PAY

## 2020-03-12 DIAGNOSIS — Z83.3: ICD-10-CM

## 2020-03-12 DIAGNOSIS — J18.0: Primary | ICD-10-CM

## 2020-03-12 DIAGNOSIS — Z85.850: ICD-10-CM

## 2020-03-12 DIAGNOSIS — Z80.0: ICD-10-CM

## 2020-03-12 DIAGNOSIS — J96.01: ICD-10-CM

## 2020-03-12 DIAGNOSIS — F10.20: ICD-10-CM

## 2020-03-12 DIAGNOSIS — Z79.899: ICD-10-CM

## 2020-03-12 DIAGNOSIS — E87.1: ICD-10-CM

## 2020-03-12 DIAGNOSIS — E89.0: ICD-10-CM

## 2020-03-12 DIAGNOSIS — Z57.2: ICD-10-CM

## 2020-03-12 LAB
ALBUMIN SERPL BCG-MCNC: 3.5 G/DL (ref 3.2–5.2)
ALBUMIN/GLOB SERPL: 1 {RATIO} (ref 1–3)
ALP SERPL-CCNC: 112 U/L (ref 34–104)
ALT SERPL W P-5'-P-CCNC: 40 U/L (ref 7–52)
ANION GAP SERPL CALC-SCNC: 11 MMOL/L (ref 2–11)
APTT PPP: 38.4 SECONDS (ref 26–38)
AST SERPL-CCNC: 23 U/L (ref 13–39)
BASOPHILS # BLD AUTO: 0.1 10^3/UL (ref 0–0.2)
BUN SERPL-MCNC: 15 MG/DL (ref 6–24)
BUN/CREAT SERPL: 16.3 (ref 8–20)
CALCIUM SERPL-MCNC: 9.5 MG/DL (ref 8.6–10.3)
CHLORIDE SERPL-SCNC: 99 MMOL/L (ref 101–111)
EOSINOPHIL # BLD AUTO: 0.6 10^3/UL (ref 0–0.6)
GLOBULIN SER CALC-MCNC: 3.4 G/DL (ref 2–4)
GLUCOSE SERPL-MCNC: 108 MG/DL (ref 70–100)
HCO3 SERPL-SCNC: 23 MMOL/L (ref 22–32)
HCT VFR BLD AUTO: 34 % (ref 42–52)
HGB BLD-MCNC: 11.6 G/DL (ref 14–18)
INR PPP/BLD: 1.19 (ref 0.82–1.09)
LYMPHOCYTES # BLD AUTO: 2 10^3/UL (ref 1–4.8)
MCH RBC QN AUTO: 31 PG (ref 27–31)
MCHC RBC AUTO-ENTMCNC: 34 G/DL (ref 31–36)
MCV RBC AUTO: 93 FL (ref 80–94)
MONOCYTES # BLD AUTO: 1.5 10^3/UL (ref 0–0.8)
NEUTROPHILS # BLD AUTO: 9.8 10^3/UL (ref 1.5–7.7)
NRBC # BLD AUTO: 0 10^3/UL
NRBC BLD QL AUTO: 0.1
PLATELET # BLD AUTO: 405 10^3/UL (ref 150–450)
POTASSIUM SERPL-SCNC: 4 MMOL/L (ref 3.5–5)
PROT SERPL-MCNC: 6.9 G/DL (ref 6.4–8.9)
RBC # BLD AUTO: 3.71 10^6 /UL (ref 4.18–5.48)
SODIUM SERPL-SCNC: 133 MMOL/L (ref 135–145)
TROPONIN I SERPL-MCNC: 0.01 NG/ML (ref ?–0.03)
WBC # BLD AUTO: 14.1 10^3/UL (ref 3.5–10.8)

## 2020-03-12 PROCEDURE — 93306 TTE W/DOPPLER COMPLETE: CPT

## 2020-03-12 PROCEDURE — 83605 ASSAY OF LACTIC ACID: CPT

## 2020-03-12 PROCEDURE — 84484 ASSAY OF TROPONIN QUANT: CPT

## 2020-03-12 PROCEDURE — 85610 PROTHROMBIN TIME: CPT

## 2020-03-12 PROCEDURE — 81003 URINALYSIS AUTO W/O SCOPE: CPT

## 2020-03-12 PROCEDURE — 80048 BASIC METABOLIC PNL TOTAL CA: CPT

## 2020-03-12 PROCEDURE — 85025 COMPLETE CBC W/AUTO DIFF WBC: CPT

## 2020-03-12 PROCEDURE — 93005 ELECTROCARDIOGRAM TRACING: CPT

## 2020-03-12 PROCEDURE — 80053 COMPREHEN METABOLIC PANEL: CPT

## 2020-03-12 PROCEDURE — 99284 EMERGENCY DEPT VISIT MOD MDM: CPT

## 2020-03-12 PROCEDURE — 71046 X-RAY EXAM CHEST 2 VIEWS: CPT

## 2020-03-12 PROCEDURE — 36415 COLL VENOUS BLD VENIPUNCTURE: CPT

## 2020-03-12 PROCEDURE — 85730 THROMBOPLASTIN TIME PARTIAL: CPT

## 2020-03-12 PROCEDURE — 87040 BLOOD CULTURE FOR BACTERIA: CPT

## 2020-03-12 SDOH — HEALTH STABILITY - PHYSICAL HEALTH: OCCUPATIONAL EXPOSURE TO DUST: Z57.2

## 2020-03-12 NOTE — ED
Respiratory





- HPI Summary


HPI Summary: 


64 year old M arriving via private car to [facility] complains of worsening 

shortness of breath since today. Recent dx pneumonia. He has been to his 

primary care provider who has placed him on several courses of antibiotics but 

he still isn't getting better. He was seen in the ED on 3/7 for similar. He was 

admitted, stayed in the hospital for 2 days, was d/c on 3/9 with rx for 

antibiotics which he has been taking. He reports decreased appetite, fever. No 

sore throat, rhinorrhea, nausea/vomiting/diarrhea. Symptoms aggravated by 

exertion. Symptoms alleviated by supplemental nasal cannula oxygen. Medications 

reviewed. Nonsmoker. His girlfriend smokes.





- History of Current Complaint


Chief Complaint: EDShortnessOfBreath


Stated Complaint: SOB PER PT


Time Seen by Provider: 03/12/20 21:24


Hx Obtained From: Patient


Onset/Duration: Lasting Hours, Still Present


Timing: Constant


Current Severity: None


Pain Intensity: 0


Aggravating Factor(s): Exertion


Alleviating Factor(s): Oxygen





- Allergy/Home Medications


Allergies/Adverse Reactions: 


 Allergies











Allergy/AdvReac Type Severity Reaction Status Date / Time


 


No Known Allergies Allergy   Verified 03/26/17 12:18











Home Medications: 


 Home Medications





Benzonatate CAP* [Tessalon 100 MG CAP*] 100 mg PO BID PRN #20 cap 03/09/20 [Rx 

Confirmed 03/12/20]


Folic Acid TAB* [Folvite TAB*] 1 mg PO DAILY #90 tab 03/09/20 [Rx Confirmed 03/ 12/20]


Levofloxacin TAB* [Levaquin 750 MG TAB*] 750 mg PO DAILY #7 tab 03/09/20 [Rx 

Confirmed 03/12/20]


Levothyroxine TAB* [Synthroid 125 MCG TAB*] 125 mcg PO 0600 #120 tab 03/09/20 [

Rx Confirmed 03/12/20]


Melatonin 3 mg PO BEDTIME PRN  tab 03/09/20 [Rx Confirmed 03/12/20]


Multivitamins/Minerals TAB* [Theragran/minerals TAB*] 1 tab PO DAILY  tab 03/09/ 20 [Rx Confirmed 03/12/20]


Thiamine TAB* [Vitamin B-1  MG*] 100 mg PO DAILY #90 tab 03/09/20 [Rx 

Confirmed 03/12/20]


guaiFENesin ER TAB [Mucinex*] 1,200 mg PO BID  tab.er 03/09/20 [Rx Confirmed 03/ 12/20]


Albuterol HFA INHALER* [Ventolin HFA Inhaler*] 2 puff INH Q4H PRN 30 Days #1 

mdi 03/15/20 [Rx]


Docusate CAP* [Colace Cap*] 100 mg PO BID  cap 03/15/20 [Rx]


predniSONE 20 mg TAB [Deltasone 20 MG TAB*] 40 mg PO SEE INSTRUCTIONS 10 Days #

10 tab 03/15/20 [Rx]











PMH/Surg Hx/FS Hx/Imm Hx


Endocrine/Hematology History: Reports: Hx Thyroid Disease


   Denies: Hx Diabetes


Cardiovascular History: 


   Denies: Hx Deep Vein Thrombosis, Hx Hypertension


Respiratory History: Reports: Hx Pneumonia


   Denies: Hx Asthma, Hx Chronic Obstructive Pulmonary Disease (COPD), Hx 

Pulmonary Edema


Sensory History: 


   Denies: Hx Contacts or Glasses, Hx Hearing Aid


Opthamlomology History: 


   Denies: Hx Contacts or Glasses





- Cancer History


Cancer Type, Location and Year: thyroid





- Surgical History


Surgery Procedure, Year, and Place: tonsillectomy, corrective eye surgery


Infectious Disease History: No


Infectious Disease History: 


   Denies: Traveled Outside the [place] in Last 30 Days





- Family History


Known Family History: Positive: Cardiac Disease





- Social History


Alcohol Use: Rare


Alcohol Amount: 5 beers a day


Substance Use Type: Reports: None


Hx Tobacco Use: No


Smoking Status (MU): Never Smoked Tobacco





Review of Systems


Positive: Fever


ENT: Negative - rhinorrhea


Negative: Sore Throat


Positive: Shortness Of Breath


Positive: Other - decreased appetite.  Negative: Vomiting, Diarrhea, Nausea


All Other Systems Reviewed And Are Negative: Yes





Physical Exam





- Summary


Physical Exam Summary: 





Appearance: Mildly dyspneic but otherwise well-appearing, well-nourished male 

lying in bed comfortably. Vital signs notable for tachycardia and hypoxemia 


Skin: Warm, dry, no obvious rash


Eyes: sclera anicteric, no conjunctival pallor


HENT: mucous membranes moist, pharynx appears normal


Neck: Supple, nontender


Respiratory: Coarse crackles in both bases without signs of focal consolidation


Cardiovascular: Normal S1, S2. No murmurs. Normal distal pulses in tibial and 

radial bilaterally.


Abdomen: Soft, nontender, normal active bowel sounds present


Musculoskeletal: Normal, Strength/ROM Intact


Neurological: A&Ox3, awake and alert, mentation is normal, speech is fluent and 

appropriate


Psychiatric: affect is normal, does not appear anxious or depressed





Triage Information Reviewed: Yes


Vital Signs On Initial Exam: 


 Initial Vitals











Temp Pulse Resp BP Pulse Ox


 


 99.3 F   105   24   116/73   83 


 


 03/12/20 20:03  03/12/20 20:03  03/12/20 20:03  03/12/20 20:03  03/12/20 20:03











Vital Signs Reviewed: Yes





Procedures





- Sedation


Patient Received Moderate/Deep Sedation with Procedure: No





Diagnostics





- Vital Signs


 Vital Signs











  Temp Pulse Resp BP Pulse Ox


 


 03/12/20 20:15      91


 


 03/12/20 20:03  99.3 F  105  24  116/73  83














- Laboratory


Result Diagrams: 


 03/14/20 06:46





 03/14/20 06:46


Lab Statement: Any lab studies that have been ordered have been reviewed, and 

results considered in the medical decision making process.





- Radiology


  ** CXR


Radiology Interpretation Completed By: ED Physician - Increased interstitial 

markings bilaterally with right worse than left. Similar to prior film. Pending 

official report.





- EKG


  ** 2204


Summary of EKG Findings: NSR at 87 BPM, P waves, QRS complex, and T waves are 

within normal limits, T waves and intervals are normal, no ischemic changes. 

This is a normal EKG. ED physician has reviewed and interpreted this EKG.





Disposition





- Course


Course Of Treatment: 63 y/o M with recent dx pneumonia c/o worsening shortness 

of breath, decreased appetite, fever since today. He has been to his primary 

care provider who has placed him on several courses of antibiotics with minimal 

improvement. Seen here on 3/7 for similar, admitted, d/c on 3/9 with rx for 

antibiotics. He is here today for worsening symptoms.  The patient is a mildly 

dyspneic but otherwise well-appearing, well-nourished male lying in bed 

comfortably. Vital signs notable for tachycardia and hypoxemia. He has coarse 

crackles in both bases without signs of focal consolidation.  Bloodwork results 

with no significant abnormalities except for WBC 14.1, RBC 3.71, Hgb 11.6, Hct 

34, absolute neuts 9.8, absolute monos 1.5, INR 1.19, APTT 38.4, sodium 133, 

chloride 99, alkaline phosphatase 112.  An EKG is within normal limits.  CXR is 

similar to prior film.  In the ED course, the patient was given an albuterol 

treatment.  The patient remains hypoxic in the ED.  Spoke with Dr. Lindsay. He 

agrees to admit the patient. The patient will be admitted to the hospitalist.





- Diagnoses


Provider Diagnoses: 


 Respiratory failure with hypoxia








- Physician Notifications


Discussed Care Of Patient With: Lazarus Onwuka


Time Discussed With Above Provider: 22:47


Instructed by Provider To: Admit As Inpatient





Discharge ED





- Sign-Out/Discharge


Documenting (check all that apply): Patient Departure





- Discharge Plan


Condition: Stable


Disposition: ADMITTED TO [facility]





- Billing Disposition and Condition


Condition: STABLE


Disposition: Admitted to [facility]





- Attestation Statements


Document Initiated by Dana: Yes


Documenting Scribe: Fany Sam


Provider For Whom Dana is Documenting (Include Credential): Freddy Eastman MD


Scribe Attestation: 


Fany TIDWELL, scribed for Freddy Eastman MD on 03/17/20 at 0603. 


Scribe Documentation Reviewed: Yes


Provider Attestation: 


The documentation as recorded by the Fany manning accurately reflects 

the service I personally performed and the decisions made by Freddy bliss MD


Status of Scribe Document: Viewed

## 2020-03-13 RX ADMIN — LEVOTHYROXINE SODIUM SCH MCG: 125 TABLET ORAL at 05:56

## 2020-03-13 RX ADMIN — ENOXAPARIN SODIUM SCH MG: 40 INJECTION SUBCUTANEOUS at 10:22

## 2020-03-13 RX ADMIN — GUAIFENESIN SCH MG: 600 TABLET, EXTENDED RELEASE ORAL at 20:54

## 2020-03-13 RX ADMIN — DOCUSATE SODIUM SCH MG: 100 CAPSULE, LIQUID FILLED ORAL at 10:22

## 2020-03-13 RX ADMIN — THERA TABS SCH TAB: TAB at 10:21

## 2020-03-13 RX ADMIN — DOCUSATE SODIUM SCH MG: 100 CAPSULE, LIQUID FILLED ORAL at 20:55

## 2020-03-13 RX ADMIN — GUAIFENESIN SCH MG: 600 TABLET, EXTENDED RELEASE ORAL at 10:21

## 2020-03-13 RX ADMIN — FOLIC ACID SCH MG: 1 TABLET ORAL at 10:21

## 2020-03-13 RX ADMIN — Medication SCH MG: at 10:21

## 2020-03-13 NOTE — ECHO
*[facility]*

[place] [address]

Phone: [phone]

Fax #: [phone]



-------------------------------------------------------------------

Transthoracic Echocardiogram



Patient: Hesham Llanos                 MRN:        [id number]

:     1955                         Study Date: 2020

Age:     64                                 Accession#: [id number]

Gender:  M                                  HR:         74 bpm

Height:  68 in /172.7 cm                    BSA:        1.84 m^2

Weight:  155.7 lb /70.8 kg                  BMI:        23.7 kg/m^2



*Sonographer: * Gina Chapman

 

*Referring Physician: * Anders ParkReading Physician: * Maghaydah, Qutaybeh MD



-------------------------------------------------------------------

Indications:   SOB.



-------------------------------------------------------------------

History:  Pneumonia.  Dyspnea.



-------------------------------------------------------------------

Conclusions



Summary:



- Left ventricle: Systolic function is normal. The estimated

  ejection fraction is 55-60%.

- Mitral valve: The findings are consistent with mild stenosis.

  There is trace to mild regurgitation.

- Tricuspid valve: There is trace regurgitation.

- No previous echocardiogram available.



-------------------------------------------------------------------

Study data:  Transthoracic echocardiogram.  Procedure:

Transthoracic echocardiography was performed. Image quality was

good.  Complete 2D, spectral Doppler, and color flow Doppler.

Location:  Bedside.  Patient status:  Inpatient. Patient room

number: 419-02.  Rhythm:  Normal sinus rhythm.



-------------------------------------------------------------------

Findings



Left ventricle:  The cavity size is normal. Wall thickness is

normal. Systolic function is normal. The estimated ejection

fraction is 55-60%. Wall motion is normal; there are no regional

wall motion abnormalities. Left ventricular diastolic function

parameters are normal.

Right ventricle:  The cavity size is normal. Systolic function is

normal.

Ventricular septum:   The ventricular septum is normal.

Left atrium:  The atrium is normal in size.

Right atrium:  The atrium is normal in size.

Atrial septum:  No defect or patent foramen ovale is identified.

Mitral valve:   The valve is structurally normal.    The findings

are consistent with mild stenosis.   There is trace to mild

regurgitation.

Aortic valve:   The valve is structurally normal. The valve is

trileaflet.   Cusp separation is normal. Transvalvular velocity is

within the normal range. There is no evidence of stenosis. There is

no significant regurgitation.

Tricuspid valve:   The valve is structurally normal.    There is no

evidence of stenosis.   There is trace regurgitation.

Pulmonic valve:    The valve is structurally normal.    There is no

evidence of stenosis.   There is trace regurgitation.

Aorta:  The aortic root appears normal. The aortic arch appears

normal.

Pericardium:  There is no significant pericardial effusion.

Pulmonary arteries:

Systolic pressure is within the normal range, estimated to be 30 mm

Hg.

Systemic veins:

Inferior vena cava: The vessel is normal in size. There is (&gt;= 50%)

respiratory change in the IVC dimension.

Pulmonary veins:  The Pulmonary veins appear normal.



-------------------------------------------------------------------

Measurements



 Left ventricle            Value        Ref         Aortic valve continued         Value       Ref

 EMBER, LAX         (L)      3.7   cm     4.2 - 5.8   Mean grad, S                   5.0   mm Hg -----

 ESD, LAX                  2.6   cm     2.5 - 4.0   Peak grad, S                   8.0   mm Hg -----

 FS, LAX                   31    %      25 - 43     LISSETTE, VTI                       3.14  cm^2  -----

 PW, ED, LAX      (H)      1.1   cm     0.6 - 1.0   LISSETTE, Vmax                      2.96  cm^2  -----

 E&apos;, lat nuris, TDI          13.0  cm/sec &gt;=10.0

 E/e&apos;, lat nuris,            7            ----------  Mitral valve                   Value       R
ef

 TDI                                                Peak E                         0.96  m/sec -----

                                                    Peak A                         0.89  m/sec -----

 LVOT                      Value        Ref         Decel time                     222   ms    -----

 Diam, S                   2.00  cm     ----------  Peak grad, D                   3.7   mm Hg -----

 Area                      3.1   cm^2   ----------  Peak E/A ratio                 1.1         -----

 Peak troy, S               1.34  m/sec  ----------

 Peak grad, S              7     mm Hg  ----------  Pulmonic valve                 Value       Ref

 Mean grad, S              4     mm Hg  ----------  Peak v, S                      0.76  m/sec -----

 SV                        93    ml     ----------  Peak grad, S                   2.0   mm Hg -----

 

 Ventricular septum        Value        Ref         Tricuspid valve                Value       Ref

 IVS, ED                   1.0   cm     0.6 - 1.0   TR peak v                      2.43  m/sec &lt;=2
.8

                                                    Peak RV-RA grad, S             24    mm Hg -----

 Right ventricle           Value        Ref         Max TR troy                     2.43  m/sec -----

 EMBER, LAX                  3.5   cm     ----------

 EMBER minor ax,    (H)      3.6   cm     1.9 - 3.5   Aortic root                    Value       Ref

 A4C mid                                            Root diam                      2.9   cm    &lt;4.
0

 

 Left atrium               Value        Ref         Ascending aorta                Value       Ref

 AP dim, ES                3.20  cm     3.00 -      AAo AP diam, S                 2.8   cm    -----

                                        4.00

 ML dim, A4C               4.2   cm     ----------  Aortic arch                    Value       Ref

 SI dim, A4C               5.0   cm     ----------  Arch diam                      2.7   cm    -----

 Vol/bsa, ES, 1-p          22    ml/m^2 12 - 37

 A4C                                                Decending aorta                Value       Ref

                                                    Anika peak troy                   0.95  m/sec -----

 Right atrium              Value        Ref

 SI dim, ES                4.3   cm     3.4 - 5.3   Inferior vena cava             Value       Ref

 ML dim, ES, A4C           3.9   cm     2.6 - 4.4   Diam                           2.1   cm    -----

 SI dim, ES, A4C           4.3   cm     3.4 - 5.3

 

 Aortic valve              Value        Ref

 Peak v, S                 1.42  m/sec  ----------

 VTI, S                    29.7  cm     ----------

 

Legend:

(L)  and  (H)  amanda values outside specified reference range.



Prepared and electronically signed by



Maghaydah, Qutaybeh MD

2020 17:16
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
## 2020-03-13 NOTE — PN
Subjective


Date of Service: 03/13/20


Interval History: 





patient seen today, he feels little better today with the oxygen.  Not as 

shortness of breath as yesterday.  No fever.  coughing.  no nausea or vomit


Past Medical History: Unchanged from Admission





Objective


Active Medications: 








Al Hydrox/Mg Hydrox/Simethicone (Maalox Plus*)  30 ml PO Q6H PRN


   PRN Reason: INDIGESTION


Albuterol (Ventolin 2.5 Mg/3 Ml Neb.Sol*)  2.5 mg INH [id number] AWAKE PRN


   PRN Reason: sob/wheezing


Albuterol/Ipratropium (Duoneb (Albuterol 2.5 Mg/Ipratropium 0.5 Mg))  1 neb INH 

[id number] AWAKE PRN


   PRN Reason: sob/wheexing


Benzonatate (Tessalon Cap*)  100 mg PO BID PRN


   PRN Reason: COUGH


   Last Admin: 03/13/20 03:51 Dose:  100 mg


Docusate Sodium (Colace Cap*)  100 mg PO BID [facility]


   Last Admin: 03/13/20 10:22 Dose:  100 mg


Enoxaparin Sodium (Lovenox(*))  40 mg SUBCUT Q24H [facility]


   Last Admin: 03/13/20 10:22 Dose:  40 mg


Folic Acid (Folvite Tab*)  1 mg PO DAILY [facility]


   Last Admin: 03/13/20 10:21 Dose:  1 mg


Guaifenesin (Mucinex*)  1,200 mg PO BID [facility]


   Last Admin: 03/13/20 10:21 Dose:  1,200 mg


Levofloxacin/Dextrose (Levaquin 750 Mg Ivpremix(*))  750 mg in 150 mls @ 100 mls

/hr IVPB Q24H [facility]; Protocol


Sodium Chloride (Ns 0.9% 1000 Ml**)  1,000 mls @ 100 mls/hr IV PER RATE [facility]


   Last Admin: 03/13/20 02:21 Dose:  100 mls/hr


Levothyroxine Sodium (Synthroid Tab*)  125 mcg PO 0600 [facility]


   Last Admin: 03/13/20 05:56 Dose:  125 mcg


Melatonin (Melatonin)  3 mg PO BEDTIME PRN


   PRN Reason: INSOMNIA


   Last Admin: 03/13/20 03:51 Dose:  3 mg


Multivitamins/Minerals (Theragran/Minerals Tab*)  1 tab PO DAILY [facility]


   Last Admin: 03/13/20 10:21 Dose:  1 tab


Ondansetron HCl (Zofran Inj*)  4 mg IV Q4H PRN


   PRN Reason: NAUSEA/VOMITING


Prednisone (Deltasone 20 Mg Tab)  40 mg PO DAILY [facility]


Thiamine HCl (Vitamin B-1 Tab*)  100 mg PO DAILY [facility]


   Last Admin: 03/13/20 10:21 Dose:  100 mg








 Vital Signs - 8 hr











  03/13/20 03/13/20 03/13/20





  07:15 08:00 11:15


 


Temperature 97.6 F  98.3 F


 


Pulse Rate 80  83


 


Respiratory 18 18 16





Rate   


 


Blood Pressure 105/67  106/65





(mmHg)   


 


O2 Sat by Pulse 93 92 94





Oximetry   











Oxygen Devices in Use Now: Nasal Cannula


Appearance: awake, alert. no distress


Eyes: No Scleral Icterus, PERRLA, -


Ears/Nose/Mouth/Throat: NL Teeth, Lips, Gums, Mucous Membranes Moist, - - poor 

dentition


Neck: NL Appearance and Movements; NL JVP, Trachea Midline


Respiratory: Symmetrical Chest Expansion and Respiratory Effort, Clear to 

Auscultation, - - bibasilar crackels


Cardiovascular: NL Sounds; No Murmurs; No JVD, No Edema


Abdominal: NL Sounds; No Tenderness; No Distention


Result Diagrams: 


 03/12/20 22:41





 03/12/20 22:41





Assess/Plan/Problems-Billing


Assessment: 





65 y/o male admitted for indwelling URI symptoms started mid february 2/16/20 

treated with augmentin and steroid oupatient.  Admitted 3/7-3/9 for bronchial 

pneumonia and hypoxia improved and discharged on Levaquin returned to ED 3/12/

20 for ongoing cough, shortness of breath and now with o2 requirment 3 liters.  

No recent travel, no documented fever (only low grade), 





- Patient Problems


(1) Bronchopneumonia


Current Visit: No   Status: Acute   Code(s): J18.0 - BRONCHOPNEUMONIA, 

UNSPECIFIED ORGANISM   SNOMED Code(s): 225799955


   Comment: - Currently will continue his levaquin 750 mg IV daily, tessalon, 

duoneb


- Influenza A&B negative


- Viral pannel obtained and send


- Discussed with ID not considered a high risk for COVID-19.  Isolation 

discontinued


- Given his CXXR finding (hyperinflation) second hand smoking will add 

prednisone taper 40 mg daily starting today   





(2) ETOH abuse


Current Visit: No   Status: Acute   Code(s): F10.10 - ALCOHOL ABUSE, 

UNCOMPLICATED   SNOMED Code(s): 15115942


   Comment: - hx of daily Drinks 4-6 alcoholic 


- I will place him on WA protocol and will add  thiamine, folic acid and 

multivitamin.   





(3) Hypothyroid


Current Visit: No   Status: Acute   Code(s): E03.9 - HYPOTHYROIDISM, 

UNSPECIFIED   SNOMED Code(s): 46707169


   Comment: - recently his levothyroxine was 150mcg, and was decreased to 

125mcg in his previous admission will continue at 125 mcg daily    





(4) DVT prophylaxis


Current Visit: No   Status: Acute   Code(s): Z29.9 - ENCOUNTER FOR PROPHYLACTIC 

MEASURES, UNSPECIFIED   SNOMED Code(s): 829017860


   Comment: -Continue lovenox.

## 2020-03-14 LAB
ANION GAP SERPL CALC-SCNC: 8 MMOL/L (ref 2–11)
BASOPHILS # BLD AUTO: 0.1 10^3/UL (ref 0–0.2)
BUN SERPL-MCNC: 13 MG/DL (ref 6–24)
BUN/CREAT SERPL: 18.3 (ref 8–20)
CALCIUM SERPL-MCNC: 8.9 MG/DL (ref 8.6–10.3)
CHLORIDE SERPL-SCNC: 102 MMOL/L (ref 101–111)
EOSINOPHIL # BLD AUTO: 0.1 10^3/UL (ref 0–0.6)
GLUCOSE SERPL-MCNC: 121 MG/DL (ref 70–100)
HCO3 SERPL-SCNC: 24 MMOL/L (ref 22–32)
HCT VFR BLD AUTO: 33 % (ref 42–52)
HGB BLD-MCNC: 11.4 G/DL (ref 14–18)
LYMPHOCYTES # BLD AUTO: 1.2 10^3/UL (ref 1–4.8)
MCH RBC QN AUTO: 32 PG (ref 27–31)
MCHC RBC AUTO-ENTMCNC: 34 G/DL (ref 31–36)
MCV RBC AUTO: 92 FL (ref 80–94)
MONOCYTES # BLD AUTO: 1 10^3/UL (ref 0–0.8)
NEUTROPHILS # BLD AUTO: 8.2 10^3/UL (ref 1.5–7.7)
NRBC # BLD AUTO: 0 10^3/UL
NRBC BLD QL AUTO: 0
PLATELET # BLD AUTO: 444 10^3/UL (ref 150–450)
POTASSIUM SERPL-SCNC: 4.2 MMOL/L (ref 3.5–5)
RBC # BLD AUTO: 3.6 10^6 /UL (ref 4.18–5.48)
SODIUM SERPL-SCNC: 134 MMOL/L (ref 135–145)
WBC # BLD AUTO: 10.5 10^3/UL (ref 3.5–10.8)

## 2020-03-14 RX ADMIN — FOLIC ACID SCH MG: 1 TABLET ORAL at 08:16

## 2020-03-14 RX ADMIN — LEVOTHYROXINE SODIUM SCH MCG: 125 TABLET ORAL at 06:36

## 2020-03-14 RX ADMIN — DOCUSATE SODIUM SCH MG: 100 CAPSULE, LIQUID FILLED ORAL at 20:45

## 2020-03-14 RX ADMIN — ENOXAPARIN SODIUM SCH MG: 40 INJECTION SUBCUTANEOUS at 08:15

## 2020-03-14 RX ADMIN — GUAIFENESIN SCH MG: 600 TABLET, EXTENDED RELEASE ORAL at 08:16

## 2020-03-14 RX ADMIN — DOCUSATE SODIUM SCH MG: 100 CAPSULE, LIQUID FILLED ORAL at 08:16

## 2020-03-14 RX ADMIN — Medication SCH MG: at 08:16

## 2020-03-14 RX ADMIN — GUAIFENESIN SCH MG: 600 TABLET, EXTENDED RELEASE ORAL at 20:45

## 2020-03-14 RX ADMIN — THERA TABS SCH TAB: TAB at 08:16

## 2020-03-14 RX ADMIN — LEVOFLOXACIN SCH MLS/HR: 5 INJECTION, SOLUTION INTRAVENOUS at 02:19

## 2020-03-14 NOTE — PN
Subjective


Date of Service: 03/14/20


Interval History: 


Patient seen today, Awake alert and oriented.  No fever or chills. Taking po.


Past Medical History: Unchanged from Admission





Objective


Active Medications: 








Al Hydrox/Mg Hydrox/Simethicone (Maalox Plus*)  30 ml PO Q6H PRN


   PRN Reason: INDIGESTION


Albuterol (Ventolin 2.5 Mg/3 Ml Neb.Sol*)  2.5 mg INH [id number] AWAKE PRN


   PRN Reason: sob/wheezing


Albuterol/Ipratropium (Duoneb (Albuterol 2.5 Mg/Ipratropium 0.5 Mg))  1 neb INH 

[id number] AWAKE PRN


   PRN Reason: sob/wheexing


Benzonatate (Tessalon Cap*)  100 mg PO BID PRN


   PRN Reason: COUGH


   Last Admin: 03/13/20 03:51 Dose:  100 mg


Docusate Sodium (Colace Cap*)  100 mg PO BID [facility]


   Last Admin: 03/14/20 08:16 Dose:  100 mg


Enoxaparin Sodium (Lovenox(*))  40 mg SUBCUT Q24H [facility]


   Last Admin: 03/14/20 08:15 Dose:  40 mg


Folic Acid (Folvite Tab*)  1 mg PO DAILY [facility]


   Last Admin: 03/14/20 08:16 Dose:  1 mg


Guaifenesin (Mucinex*)  1,200 mg PO BID [facility]


   Last Admin: 03/14/20 08:16 Dose:  1,200 mg


Levofloxacin/Dextrose (Levaquin 750 Mg Ivpremix(*))  750 mg in 150 mls @ 100 mls

/hr IVPB Q24H [facility]; Protocol


   Last Admin: 03/14/20 02:19 Dose:  100 mls/hr


Levothyroxine Sodium (Synthroid Tab*)  125 mcg PO 0600 [facility]


   Last Admin: 03/14/20 06:36 Dose:  125 mcg


Melatonin (Melatonin)  3 mg PO BEDTIME PRN


   PRN Reason: INSOMNIA


   Last Admin: 03/13/20 03:51 Dose:  3 mg


Multivitamins/Minerals (Theragran/Minerals Tab*)  1 tab PO DAILY [facility]


   Last Admin: 03/14/20 08:16 Dose:  1 tab


Ondansetron HCl (Zofran Inj*)  4 mg IV Q4H PRN


   PRN Reason: NAUSEA/VOMITING


Prednisone (Deltasone 20 Mg Tab)  40 mg PO DAILY [facility]


   Last Admin: 03/14/20 08:16 Dose:  40 mg


Thiamine HCl (Vitamin B-1 Tab*)  100 mg PO DAILY [facility]


   Last Admin: 03/14/20 08:16 Dose:  100 mg








 Vital Signs - 8 hr











  03/14/20 03/14/20 03/14/20





  08:00 10:00 11:54


 


Temperature 97.9 F 97.9 F 


 


Pulse Rate 80 84 


 


Respiratory 18 18 





Rate   


 


Blood Pressure 111/68 113/67 





(mmHg)   


 


O2 Sat by Pulse 96 95 93





Oximetry   














  03/14/20 03/14/20





  12:04 14:00


 


Temperature 98.3 F 98.2 F


 


Pulse Rate 73 78


 


Respiratory 20 20





Rate  


 


Blood Pressure 107/66 121/64





(mmHg)  


 


O2 Sat by Pulse 92 93





Oximetry  











Oxygen Devices in Use Now: Nasal Cannula, High Flow Nasal Cannula


Appearance: awake, alert no fever


Eyes: No Scleral Icterus, - - EOMI


Ears/Nose/Mouth/Throat: NL Teeth, Lips, Gums, Mucous Membranes Moist


Neck: NL Appearance and Movements; NL JVP, Trachea Midline


Respiratory: Symmetrical Chest Expansion and Respiratory Effort, - - fine 

expiratoyr wheezing


Cardiovascular: NL Sounds; No Murmurs; No JVD


Abdominal: NL Sounds; No Tenderness; No Distention


Result Diagrams: 


 03/14/20 06:46





 03/14/20 06:46


Microbiology and Other Data: 


 Microbiology











 03/12/20 22:41 Aerobic Blood Culture - Preliminary





 Blood Venous    No Growth Day 1





 Anaerobic Blood Culture - Preliminary





    No Growth Day 1


 


 03/12/20 22:41 Aerobic Blood Culture - Preliminary





 Blood Venous    No Growth Day 1





 Anaerobic Blood Culture - Preliminary





    No Growth Day 1














Assess/Plan/Problems-Billing


Assessment: 





63 y/o male admitted for indwelling URI symptoms started mid february 2/16/20 

treated with augmentin and steroid oupatient.  Admitted 3/7-3/9 for bronchial 

pneumonia and hypoxia improved and discharged on Levaquin returned to ED 3/12/

20 for ongoing cough, shortness of breath and now with o2 requirment 3 liters.  

No recent travel, no documented fever (only low grade), 





- Patient Problems


(1) Bronchopneumonia


Current Visit: No   Status: Acute   Code(s): J18.0 - BRONCHOPNEUMONIA, 

UNSPECIFIED ORGANISM   SNOMED Code(s): 897286425


   Comment: - Currently will continue his levaquin 750 mg IV daily, tessalon, 

duoneb


- Influenza A&B negative


- Viral pannel obtained and send


- Discussed with ID not considered a high risk for COVID-19.  Isolation 

discontinued


- Given his CXXR finding (hyperinflation) second hand smoking will continue 

prednisone taper 40 mg daily    





(2) ETOH abuse


Current Visit: No   Status: Acute   Code(s): F10.10 - ALCOHOL ABUSE, 

UNCOMPLICATED   SNOMED Code(s): 66908890


   Comment: - hx of daily Drinks 4-6 alcoholic 


- I will place him on WA protocol and will add  thiamine, folic acid and 

multivitamin.  so far negative   





(3) Hypothyroid


Current Visit: No   Status: Acute   Code(s): E03.9 - HYPOTHYROIDISM, 

UNSPECIFIED   SNOMED Code(s): 32848088


   Comment: - recently his levothyroxine was 150mcg, and was decreased to 

125mcg in his previous admission will continue at 125 mcg daily    





(4) DVT prophylaxis


Current Visit: No   Status: Acute   Code(s): Z29.9 - ENCOUNTER FOR PROPHYLACTIC 

MEASURES, UNSPECIFIED   SNOMED Code(s): 713409193


   Comment: -Continue lovenox.

## 2020-03-15 VITALS — SYSTOLIC BLOOD PRESSURE: 121 MMHG | DIASTOLIC BLOOD PRESSURE: 81 MMHG

## 2020-03-15 RX ADMIN — ENOXAPARIN SODIUM SCH MG: 40 INJECTION SUBCUTANEOUS at 10:00

## 2020-03-15 RX ADMIN — GUAIFENESIN SCH MG: 600 TABLET, EXTENDED RELEASE ORAL at 10:46

## 2020-03-15 RX ADMIN — LEVOTHYROXINE SODIUM SCH MCG: 125 TABLET ORAL at 05:42

## 2020-03-15 RX ADMIN — LEVOFLOXACIN SCH MLS/HR: 5 INJECTION, SOLUTION INTRAVENOUS at 02:37

## 2020-03-15 RX ADMIN — Medication SCH MG: at 10:46

## 2020-03-15 RX ADMIN — DOCUSATE SODIUM SCH MG: 100 CAPSULE, LIQUID FILLED ORAL at 10:47

## 2020-03-15 RX ADMIN — THERA TABS SCH TAB: TAB at 10:47

## 2020-03-15 RX ADMIN — FOLIC ACID SCH MG: 1 TABLET ORAL at 10:47

## 2020-03-15 NOTE — DS
CC:  CLAUDIO Hoff; Dr. Melina Younger *

 

DISCHARGE SUMMARY:

 

DATE OF ADMISSION:  20

 

DATE OF DISCHARGE:  03/15/20

 

PRIMARY CARE PROVIDER:  CLAUDIO Hoff

 

FINAL DISCHARGE DIAGNOSES:

1.  Bronchial pneumonia, probably due to chronic occupational exposure, dust, 
and being employed on a farm and .

2.  Acute hypoxic respiratory failure, required oxygen supplementation.

3.  Alcohol dependency.

4.  Hypothyroidism.

 

HOSPITAL COURSE:  The patient was admitted to [facility] on 20 
shortly after he was released from the hospital for what appeared to be 
bronchial pneumonia on oral Levaquin.  He was doing well for 1 to 2 days; 
however, he started feeling worse back on day #2.  He represented to the 
emergency room and on presentation he was again requiring high-flow oxygen at 5 
L.  He was started on IV Levaquin, which is the same that he was at home and he 
was seen and evaluated by me the following day.  On 20, shortly after his 
arrival to the floor the following morning on bed assessment, he had 
significant expiratory wheezing and coarse rhonchi with transmitted upper 
airway breath sounds.  He was placed on isolation and given his prolonged 
course of URI that started on outpatient basis by his primary that required 
oral antibiotic and prednisone, then 10 days or so he came into the ER where he 
was admitted on his last hospitalization, discharged home and failed again, now 
he is back, I was quite concerned that he may have some viral illness related 
to the COVID-19.  I placed him on isolation.  I did the viral panel for 
respiratory viral panel, which was negative.  I discussed with ID regarding 
testing for COVID-19.  It was not recommended given the fact that he was 
improving with a short course of steroids as an outpatient, then discharged, 
improving, did not act and behave as COVID-19 patient; therefore, he was not 
tested.  I started prednisone therapy after it was confirmed that was not the 
case by ID and significantly he improved over the next 24 hours in 
functionality and I was able to wean him off on oxygen progressively over the 
past 24 hours from 5 L and now he is 94% on 3 L, either with ambulation he 
maintained 92%.  Therefore, the patient verbalized and expressed significant 
improvement.  I am going to be releasing him home with a short taper of 
prednisone, but he was instructed to follow up with pulmonologist for proper 
followup.  He probably will benefit from pulmonary function tests and pulmonary 
rehabilitation.  The patient is in agreement and currently he is deemed stable 
for discharge.

 

PHYSICAL EXAMINATION:  Vital Signs:  Temperature 97.9, pulse 82, respiratory 
rate 20, satting 96% on room air, blood pressure 121/81.  Generally, he is awake
, alert, pleasant, in no distress.  Head and Neck:  Normocephalic, atraumatic.  
Lungs: Clear to auscultation.  I did not appreciate any wheezing.  He does have 
some minimal bibasilar crackles at the bases.  Abdomen:  Positive bowel sounds.
  Soft, nontender, nondistended.  CNS:  There is no motor or focal sensory 
deficit.  Moving extremities x4.

 

DIAGNOSTIC STUDIES/LAB DATA:  When he presented, his white count was 14,000, 
down to 10.5.

 

His chemistry:  Sodium 134, mildly hyponatremic, otherwise electrolytes 
unremarkable.  Urinalysis negative.  Serology for his respiratory viral panel 
was all negative, but was not tested for SARS and COVID.

 

Blood cultures negative x2 days.

 

Imaging:  He had a chest x-ray, which was done on 20, did not reveal any 
significant infiltrate, but he does have some interstitial opacification which 
I presume could be probably from _____ or bronchiectasis.

 

Echocardiogram was done on 20 to assess his LV function, which shows 
normal ejection fraction, EF is 55% to 60%.  No significant valvular disease.

 

DISCHARGE MEDICATIONS:  He will be discharged on his routine home medications 
includin.  To resume his Levaquin 750 mg daily as per home regimen.

2.  I am going to send him on prednisone taper 40 mg for 2 more days, then 20 
mg for 4 days, then 10 mg for 4 days.

3.  New prescription for albuterol inhaler 2 puffs every 4 hours as needed.

4.  Colace 100 mg b.i.d.

5.  Tessalon 100 mg twice a day p.r.n.

6.  Folic acid 1 mg daily.

7.  Mucinex 1200 b.i.d.

8.  Levoxyl 125 daily.

9.  Melatonin 3 mg at bedtime.

10.  Multivitamin daily.

11.  Thiamine 100 mg daily.

 

DISCHARGE RECOMMENDATIONS:

1.  Follow up with Dr. Melina Younger in 1 to 2 weeks.

2.  Follow up with your primary care in [place].

3.  Remain low in activity, avoid tobacco exposure, take all medications as 
prescribed.  If symptoms reoccur or persistent, return to the emergency room 
immediately.

 

DISCHARGE DISPOSITION:  Home.

 

DISCHARGE CONDITION:  Stable.

 

 [id number]/CPS #: [id number]

QAMAR

## 2020-03-19 ENCOUNTER — HOSPITAL ENCOUNTER (EMERGENCY)
Dept: HOSPITAL 25 - UCEAST | Age: 65
Discharge: HOME | End: 2020-03-19
Payer: COMMERCIAL

## 2020-03-19 VITALS — DIASTOLIC BLOOD PRESSURE: 80 MMHG | SYSTOLIC BLOOD PRESSURE: 123 MMHG

## 2020-03-19 DIAGNOSIS — Z79.890: ICD-10-CM

## 2020-03-19 DIAGNOSIS — R05: Primary | ICD-10-CM

## 2020-03-19 DIAGNOSIS — R06.02: ICD-10-CM

## 2020-03-19 DIAGNOSIS — R53.83: ICD-10-CM

## 2020-03-19 DIAGNOSIS — E03.9: ICD-10-CM

## 2020-03-19 PROCEDURE — 99211 OFF/OP EST MAY X REQ PHY/QHP: CPT

## 2020-03-19 PROCEDURE — U0002 COVID-19 LAB TEST NON-CDC: HCPCS

## 2020-03-19 PROCEDURE — 71046 X-RAY EXAM CHEST 2 VIEWS: CPT

## 2020-03-19 PROCEDURE — G0463 HOSPITAL OUTPT CLINIC VISIT: HCPCS

## 2020-03-19 NOTE — UC
Respiratory Complaint HPI





- HPI Summary


HPI Summary: 





64 yo male presents requesting COVID testing. He tells me that he has been 

having SOB since mid february. He saw his PCP and had a CXR on 3/2 (CXR 3/2: 

IMPRESSION: #. Probable mild inflammatory infiltrate at the RIGHT midlung zone.)

. His SOB worsened, he developed a fever and cough and went to the ED on 3/7 

and Chest CT 3/7: IMPRESSION: #. The constellation of findings favors mild 

bronchopneumonia. #.Upper normal 1.0 cm short axis precarinal lymph node and 

similar RIGHT suprahilar lymph node enlargement compared with the 2015 CT. He 

was admitted with Sepsis secondary to PNA and then discharged on 3/9. He 

returned to the ED on 3/12 (CXR 3/12: IMPRESSION: DEVELOPING PATTERN OF 

PERIHILAR INTERSTITIAL OPACIFICATION.). He was admitted again, treated with 

levaquin, and discharged on 3/15. During his stay a respiratory panel was done 

that was negative, but was not tested for COVID-19. 








Since his discharge on 3/15 he has still had a cough and feeling short of breath

, but has not had a fever since being discharged. He has an appointment with 

his PCP tomorrow for a hospital follow up. 





Denies known exposure to COVID. Denies fever, chills, sore throat, sinus 

symptoms, chest pain, abdominal pain, n/v. 





- History of Current Complaint


Stated Complaint: RESPIRATORY ISSUE


Time Seen by Provider: 03/19/20 11:58


Hx Obtained From: Patient





- Allergies/Home Medications


Allergies/Adverse Reactions: 


 Allergies











Allergy/AdvReac Type Severity Reaction Status Date / Time


 


No Known Allergies Allergy   Verified 03/19/20 12:10











Home Medications: 


 Home Medications





Levothyroxine TAB* [Synthroid 125 MCG TAB*] 125 mcg PO 0600 #120 tab 03/09/20 [

Rx Confirmed 03/12/20]


Multivitamins/Minerals TAB* [Theragran/minerals TAB*] 1 tab PO DAILY  tab 03/09/ 20 [Rx Confirmed 03/12/20]


Albuterol HFA INHALER* [Ventolin HFA Inhaler*] 2 puff INH Q4H PRN 30 Days #1 

mdi 03/15/20 [Rx]


predniSONE 20 mg TAB [Deltasone 20 MG TAB*] 40 mg PO SEE INSTRUCTIONS 10 Days #

10 tab 03/15/20 [Rx]


Benzonatate CAP* [Tessalon 100 MG CAP*] 100 mg PO TID 03/19/20 [History 

Confirmed 03/19/20]


Loratadine [Claritin] 10 mg PO DAILY 03/19/20 [History Confirmed 03/19/20]











PMH/Surg Hx/FS Hx/Imm Hx





- Additional Past Medical History


Additional PMH: 





Sepsis


ARDS/PNA


Thyroid cancer


Endocrine History: Hypothyroidism





- Surgical History


Surgical History: Yes


Surgery Procedure, Year, and Place: tonsillectomy, corrective eye surgery





- Family History


Known Family History: Positive: Cardiac Disease





- Social History


Lives: With Family


Alcohol Use: Daily


Alcohol Amount: 5 beers a day


Substance Use Type: None


Smoking Status (MU): Never Smoked Tobacco


Household Exposure Type: Cigarettes





- Immunization History


Most Recent Influenza Vaccination: 03/08/20


Most Recent Pneumonia Vaccination: october, 2019





Review of Systems


All Other Systems Reviewed And Are Negative: No


Constitutional: Positive: Fatigue


Skin: Positive: Negative


Eyes: Positive: Negative


ENT: Positive: Negative


Respiratory: Positive: Shortness Of Breath, Cough


Cardiovascular: Positive: Negative


Gastrointestinal: Positive: Negative





Physical Exam





- Summary


Physical Exam Summary: 





GENERAL: NAD. WDWN. No pain distress.


SKIN: No rashes, sores, lesions, or open wounds.


HEENT:


            Head: AT/NC


            Eyes: Conjunctiva clear without inflammation or discharge.


            Ears: Hearing grossly normal. TMs intact, no bulging, erythema, or 

edema. 


            Nose: Nasal mucosa pink and moist. NTTP maxillary and frontal 

sinus. 


            Throat: Posterior oropharynx without exudates, erythema, or 

tonsillar enlargement.  Uvula midline.


NECK: Supple. Nontender. No lymphadenopathy. 


CHEST: Crackles right base. No accessory muscle use. Breathing comfortably and 

in no distress.


CV:  RRR. Pulses intact. Cap refill <2seconds


NEURO: Alert. 


PSYCH: Age appropriate behavior.





Triage Information Reviewed: Yes


Vital Signs: 





Vital Signs:











Temp Pulse Resp BP Pulse Ox


 


 98.7 F   90   18   123/80   91 


 


 03/19/20 12:13  03/19/20 12:13  03/19/20 12:13  03/19/20 12:13  03/19/20 12:13








 Laboratory Tests











  03/19/20





  12:28


 


Influenza A (Rapid)  Negative


 


Influenza B (Rapid)  Negative











Vital Signs Reviewed: Yes





Diagnostics





- Radiology


  ** CXR


Radiology Interpretation Completed By: Radiologist


Summary of Radiographic Findings: IMPRESSION: NO ACTIVE CARDIOPULMONARY DISEASE.





Respiratory Course/Dx





- Course


Course Of Treatment: 





CXR as above.


Flu negative.


COVID sample obtained.


He has an appointment for a hosp f/u with his PCP tomorrow morning -- 

encouraged to keep this. He is still symptomatic from PNA with decreased O2%, 

but is afebrile and feeling about the same from his latest hosp discharge. If 

his symptoms worsen to go to the ED. 





Exam performed utilizing CDC recommended PPE. 





You are being tested for COVID-19. You need to quarantine yourself in a bedroom 

and bathroom only you are using. You may not leave the house. 


TC will contact you and notify of results when they are available.


Advised to be on home isolation until cleared by the health department.


Go to ED for increased SOB or any difficulty breathing - new or worsening 

symptoms.








- Differential Dx/Diagnosis


Provider Diagnosis: 


 Cough








Discharge ED





- Sign-Out/Discharge


Documenting (check all that apply): Patient Departure


All imaging exams completed and their final reports reviewed: Yes





- Discharge Plan


Condition: Stable


Disposition: HOME


Referrals: 


Ahsan Townsend PA [Primary Care Provider] - 


Additional Instructions: 


CHEST X-RAY WAS NORMAL TODAY





PLEASE FOLLOW UP WITH YOUR DOCTOR AS SCHEDULED TOMORROW





You are being tested for COVID-19. You need to quarantine yourself in a bedroom 

and bathroom only you are using. You may not leave the house. 


TC will contact you and notify of results when they are available.


Advised to be on home isolation until cleared by the health department.


Go to ED for increased SOB or any difficulty breathing - new or worsening 

symptoms.








- Billing Disposition and Condition


Condition: STABLE


Disposition: Home

## 2020-04-15 ENCOUNTER — HOSPITAL ENCOUNTER (INPATIENT)
Dept: HOSPITAL 25 - ED | Age: 65
LOS: 6 days | Discharge: HOME | DRG: 142 | End: 2020-04-21
Attending: INTERNAL MEDICINE | Admitting: HOSPITALIST
Payer: COMMERCIAL

## 2020-04-15 DIAGNOSIS — J67.9: Primary | ICD-10-CM

## 2020-04-15 DIAGNOSIS — J96.01: ICD-10-CM

## 2020-04-15 DIAGNOSIS — R06.02: ICD-10-CM

## 2020-04-15 DIAGNOSIS — Z85.850: ICD-10-CM

## 2020-04-15 DIAGNOSIS — J98.11: ICD-10-CM

## 2020-04-15 DIAGNOSIS — R05: ICD-10-CM

## 2020-04-15 DIAGNOSIS — D47.3: ICD-10-CM

## 2020-04-15 DIAGNOSIS — E89.0: ICD-10-CM

## 2020-04-15 DIAGNOSIS — K21.9: ICD-10-CM

## 2020-04-15 DIAGNOSIS — Z77.22: ICD-10-CM

## 2020-04-15 DIAGNOSIS — Z79.899: ICD-10-CM

## 2020-04-15 DIAGNOSIS — Z83.3: ICD-10-CM

## 2020-04-15 DIAGNOSIS — Z80.0: ICD-10-CM

## 2020-04-15 LAB
ALBUMIN SERPL BCG-MCNC: 3.8 G/DL (ref 3.2–5.2)
ALBUMIN/GLOB SERPL: 1 {RATIO} (ref 1–3)
ALP SERPL-CCNC: 76 U/L (ref 34–104)
ALT SERPL W P-5'-P-CCNC: 9 U/L (ref 7–52)
ANION GAP SERPL CALC-SCNC: 9 MMOL/L (ref 2–11)
AST SERPL-CCNC: 12 U/L (ref 13–39)
BASOPHILS # BLD AUTO: 0.1 10^3/UL (ref 0–0.2)
BUN SERPL-MCNC: 16 MG/DL (ref 6–24)
BUN/CREAT SERPL: 15.2 (ref 8–20)
CALCIUM SERPL-MCNC: 9.5 MG/DL (ref 8.6–10.3)
CHLORIDE SERPL-SCNC: 99 MMOL/L (ref 101–111)
EOSINOPHIL # BLD AUTO: 0.3 10^3/UL (ref 0–0.6)
GLOBULIN SER CALC-MCNC: 4 G/DL (ref 2–4)
GLUCOSE SERPL-MCNC: 95 MG/DL (ref 70–100)
HCO3 SERPL-SCNC: 23 MMOL/L (ref 22–32)
HCT VFR BLD AUTO: 40 % (ref 42–52)
HGB BLD-MCNC: 13.2 G/DL (ref 14–18)
LYMPHOCYTES # BLD AUTO: 1.1 10^3/UL (ref 1–4.8)
MCH RBC QN AUTO: 31 PG (ref 27–31)
MCHC RBC AUTO-ENTMCNC: 33 G/DL (ref 31–36)
MCV RBC AUTO: 93 FL (ref 80–94)
MONOCYTES # BLD AUTO: 1.2 10^3/UL (ref 0–0.8)
NEUTROPHILS # BLD AUTO: 16.7 10^3/UL (ref 1.5–7.7)
NRBC # BLD AUTO: 0 10^3/UL
NRBC BLD QL AUTO: 0
PLATELET # BLD AUTO: 520 10^3/UL (ref 150–450)
POTASSIUM SERPL-SCNC: 4 MMOL/L (ref 3.5–5)
PROT SERPL-MCNC: 7.8 G/DL (ref 6.4–8.9)
RBC # BLD AUTO: 4.29 10^6 /UL (ref 4.18–5.48)
SODIUM SERPL-SCNC: 131 MMOL/L (ref 135–145)
TROPONIN I SERPL-MCNC: 0.01 NG/ML (ref ?–0.03)
TSH SERPL-ACNC: 0.38 MCIU/ML (ref 0.34–5.6)
WBC # BLD AUTO: 19.4 10^3/UL (ref 3.5–10.8)

## 2020-04-15 PROCEDURE — 86200 CCP ANTIBODY: CPT

## 2020-04-15 PROCEDURE — 86038 ANTINUCLEAR ANTIBODIES: CPT

## 2020-04-15 PROCEDURE — 83735 ASSAY OF MAGNESIUM: CPT

## 2020-04-15 PROCEDURE — 36415 COLL VENOUS BLD VENIPUNCTURE: CPT

## 2020-04-15 PROCEDURE — 87899 AGENT NOS ASSAY W/OPTIC: CPT

## 2020-04-15 PROCEDURE — 85379 FIBRIN DEGRADATION QUANT: CPT

## 2020-04-15 PROCEDURE — 71045 X-RAY EXAM CHEST 1 VIEW: CPT

## 2020-04-15 PROCEDURE — G2023 SPECIMEN COLLECT COVID-19: HCPCS

## 2020-04-15 PROCEDURE — 84145 PROCALCITONIN (PCT): CPT

## 2020-04-15 PROCEDURE — 86431 RHEUMATOID FACTOR QUANT: CPT

## 2020-04-15 PROCEDURE — 87635 SARS-COV-2 COVID-19 AMP PRB: CPT

## 2020-04-15 PROCEDURE — 86609 BACTERIUM ANTIBODY: CPT

## 2020-04-15 PROCEDURE — 94640 AIRWAY INHALATION TREATMENT: CPT

## 2020-04-15 PROCEDURE — 84484 ASSAY OF TROPONIN QUANT: CPT

## 2020-04-15 PROCEDURE — 96367 TX/PROPH/DG ADDL SEQ IV INF: CPT

## 2020-04-15 PROCEDURE — 87205 SMEAR GRAM STAIN: CPT

## 2020-04-15 PROCEDURE — 80048 BASIC METABOLIC PNL TOTAL CA: CPT

## 2020-04-15 PROCEDURE — 83605 ASSAY OF LACTIC ACID: CPT

## 2020-04-15 PROCEDURE — 86140 C-REACTIVE PROTEIN: CPT

## 2020-04-15 PROCEDURE — 99284 EMERGENCY DEPT VISIT MOD MDM: CPT

## 2020-04-15 PROCEDURE — 71250 CT THORAX DX C-: CPT

## 2020-04-15 PROCEDURE — 87070 CULTURE OTHR SPECIMN AEROBIC: CPT

## 2020-04-15 PROCEDURE — 96365 THER/PROPH/DIAG IV INF INIT: CPT

## 2020-04-15 PROCEDURE — 81003 URINALYSIS AUTO W/O SCOPE: CPT

## 2020-04-15 PROCEDURE — 87040 BLOOD CULTURE FOR BACTERIA: CPT

## 2020-04-15 PROCEDURE — 87077 CULTURE AEROBIC IDENTIFY: CPT

## 2020-04-15 PROCEDURE — 96372 THER/PROPH/DIAG INJ SC/IM: CPT

## 2020-04-15 PROCEDURE — 80053 COMPREHEN METABOLIC PANEL: CPT

## 2020-04-15 PROCEDURE — 86606 ASPERGILLUS ANTIBODY: CPT

## 2020-04-15 PROCEDURE — 96366 THER/PROPH/DIAG IV INF ADDON: CPT

## 2020-04-15 PROCEDURE — 86235 NUCLEAR ANTIGEN ANTIBODY: CPT

## 2020-04-15 PROCEDURE — 85025 COMPLETE CBC W/AUTO DIFF WBC: CPT

## 2020-04-15 PROCEDURE — 82164 ANGIOTENSIN I ENZYME TEST: CPT

## 2020-04-15 PROCEDURE — 84443 ASSAY THYROID STIM HORMONE: CPT

## 2020-04-15 RX ADMIN — METHYLPREDNISOLONE SODIUM SUCCINATE SCH MG: 125 INJECTION, POWDER, FOR SOLUTION INTRAMUSCULAR; INTRAVENOUS at 23:53

## 2020-04-15 RX ADMIN — ENOXAPARIN SODIUM SCH MG: 40 INJECTION SUBCUTANEOUS at 18:11

## 2020-04-15 RX ADMIN — METHYLPREDNISOLONE SODIUM SUCCINATE SCH MG: 125 INJECTION, POWDER, FOR SOLUTION INTRAMUSCULAR; INTRAVENOUS at 18:11

## 2020-04-15 RX ADMIN — DOXYCYCLINE SCH: 100 INJECTION, POWDER, LYOPHILIZED, FOR SOLUTION INTRAVENOUS at 17:05

## 2020-04-15 RX ADMIN — GUAIFENESIN SCH MG: 600 TABLET, EXTENDED RELEASE ORAL at 20:50

## 2020-04-15 RX ADMIN — DOXYCYCLINE SCH MLS/HR: 100 INJECTION, POWDER, LYOPHILIZED, FOR SOLUTION INTRAVENOUS at 16:45

## 2020-04-15 RX ADMIN — CEFEPIME HYDROCHLORIDE SCH MLS/HR: 2 INJECTION, POWDER, FOR SOLUTION INTRAVENOUS at 23:53

## 2020-04-15 NOTE — XMS REPORT
Continuity of Care Document (CCD)

 Created on:2020



Patient:Hesham Llanos

Sex:Male

:1955

External Reference #:[id number]





Demographics







 Address  [address] [id number]

 

 Mobile Phone  [phone]

 

 Preferred Language  Unknown

 

 Marital Status  Unknown

 

 Religion Affiliation  Unknown

 

 Race  Unknown

 

 Ethnic Group  Unknown









Author







 Name  Anders Park M.D. (transmitted by agent of provider Angelika Starkey)

 

 Address  [address]



   Unavailable



   [place] [address]









Problems







 Description

 

 No Information Available







Social History







 Type  Date  Description  Comments

 

 Birth Sex    Unknown  







Allergies, Adverse Reactions, Alerts







 Description

 

 No Information Available







Medications







 Description

 

 No Information Available







Immunizations







 Description

 

 No Information Available







Vital Signs







 Description

 

 No Information Available







Results







 Description

 

 No Information Available







Procedures







 Description

 

 No Information Available







Medical Devices







 Description

 

 No Information Available







Encounters







 Type  Date  Location  Provider  Dx  Diagnosis

 

 Office Visit  03/15/2020  [facility]  Anders  J18.0  Bronchopneumonia,



   10:49a  angelci Rivers M.D.    unspecified



     Hospitalists      organism









 J96.01  Acute respiratory failure with hypoxia

 

 F10.20  Alcohol dependence, uncomplicated

 

 E03.9  Hypothyroidism, unspecified









 Office  2020  [facility]  Anders  J18.0  Bronchopneumonia,



 Visit  10:49a  angelic Rivers unspecified organism



     Ha JACINTO    









 F10.10  Alcohol abuse, uncomplicated

 

 E03.9  Hypothyroidism, unspecified









 Office Visit  2020  [facility]  Mallory Leon  J18.9  Pneumonia,



   10:48a  Assangelic madera M.D.    unspecified



     Hospitalists      organism









 J96.91  Respiratory failure, unspecified with hypoxia

 

 E03.9  Hypothyroidism, unspecified









 Office Visit  2020  [facility]  Jennifer  J18.9  Pneumonia,



   10:03a  Assocangelic PA    unspecified



     Hospitalists      organism









 R94.6  Abnormal results of thyroid function studies

 

 D64.9  Anemia, unspecified

 

 E03.9  Hypothyroidism, unspecified

 

 C73  Malignant neoplasm of thyroid gland









 Office Visit  2020 10:01a  [facility]  Jack  J96.01  Acute 
respiratory



     Assoc,DELROY Alexis    failure with



     Hospitalists      hypoxia









 A41.9  Sepsis, unspecified organism

 

 E03.9  Hypothyroidism, unspecified







Assessments







 Date  Code  Description  Provider

 

 03/15/2020  J18.0  Bronchopneumonia, unspecified organism  Anders PichardoMARYBETH garcia.D.

 

 03/15/2020  J96.01  Acute respiratory failure with hypoxia  Andersmildred PichardoMARYBETH garcia.D.

 

 03/15/2020  F10.20  Alcohol dependence, uncomplicated  Anders Park, M.D.

 

 03/15/2020  E03.9  Hypothyroidism, unspecified  Andersmildred Tuckerrosaem, M.D.

 

 2020  J18.0  Bronchopneumonia, unspecified organism  Anders Pichardojose, 
M.D.

 

 2020  F10.10  Alcohol abuse, uncomplicated  Anders Park, M.D.

 

 2020  E03.9  Hypothyroidism, unspecified  Andersmildred Tuckerjim, M.D.

 

 2020  J18.9  Pneumonia, unspecified organism  Malolry Leon M.D.

 

 2020  J96.91  Respiratory failure, unspecified with  Mallory Leon M.D.



     hypoxia  

 

 2020  E03.9  Hypothyroidism, unspecified  Mallory Leon M.D.

 

 2020  J18.9  Pneumonia, unspecified organism  DELROY Dalton

 

 2020  R94.6  Abnormal results of thyroid function  DELROY Dalton



     studies  

 

 2020  D64.9  Anemia, unspecified  DELROY Dalton

 

 2020  E03.9  Hypothyroidism, unspecified  DELROY Dalton

 

 2020  C73  Malignant neoplasm of thyroid gland  DELROY Dalton

 

 2020  J96.01  Acute respiratory failure with hypoxia  Kourtney Hutchins, NP

 

 2020  J18.0  Bronchopneumonia, unspecified organism  Kourtney Hutchins, NP

 

 2020  E03.9  Hypothyroidism, unspecified  Kourtney Hutchins, NP

 

 2020  F10.10  Alcohol abuse, uncomplicated  Kourtney Hutchins, NP

 

 2020  D64.9  Anemia, unspecified  Kourtney Hutchins, NP

 

 2020  J96.01  Acute respiratory failure with hypoxia  DELROY Scott

 

 2020  A41.9  Sepsis, unspecified organism  DELROY Scott

 

 2020  E03.9  Hypothyroidism, unspecified  DELROY Scott







Plan of Treatment

No Information Available



Functional Status







 Description

 

 No Information Available







Mental Status







 Description

 

 No Information Available







Referrals







 Description

 

 No Information Available

## 2020-04-15 NOTE — XMS REPORT
Continuity of Care Document (CCD)

 Created on:2020



Patient:Hesham Llanos

Sex:Male

:1955

External Reference #:[id number]





Demographics







 Address  [address] [id number]

 

 Mobile Phone  [phone]

 

 Preferred Language  Unknown

 

 Marital Status  Unknown

 

 Synagogue Affiliation  Unknown

 

 Race  Unknown

 

 Ethnic Group  Unknown









Author







 Name  Kourtney Hutchins NP (transmitted by agent of provider Angelika Starkey)

 

 Address  [address] [place]



   [place] [address]









Problems







 Description

 

 No Information Available







Social History







 Type  Date  Description  Comments

 

 Birth Sex    Unknown  







Allergies, Adverse Reactions, Alerts







 Description

 

 No Information Available







Medications







 Description

 

 No Information Available







Immunizations







 Description

 

 No Information Available







Vital Signs







 Description

 

 No Information Available







Results







 Description

 

 No Information Available







Procedures







 Description

 

 No Information Available







Medical Devices







 Description

 

 No Information Available







Encounters







 Type  Date  Location  Provider  Dx  Diagnosis

 

 [facility]  J18.9  Pneumonia,



   10:03a  Assoc,DELROY Wagner    unspecified



     Hospitalists      organism









 R94.6  Abnormal results of thyroid function studies

 

 D64.9  Anemia, unspecified

 

 E03.9  Hypothyroidism, unspecified

 

 C73  Malignant neoplasm of thyroid gland









 Office Visit  2020 10:01a  [facility]  Jack  J96.01  Acute 
respiratory



     Assoc,DELROY Alexis    failure with



     Hospitalists      hypoxia









 A41.9  Sepsis, unspecified organism

 

 E03.9  Hypothyroidism, unspecified







Assessments







 Date  Code  Description  Provider

 

 2020  J18.9  Pneumonia, unspecified organism  Mallory Leon M.D.

 

 2020  J96.91  Respiratory failure, unspecified with hypoxia  Mallory Leon M.D.

 

 2020  E03.9  Hypothyroidism, unspecified  Mallory Leon M.D.

 

 2020  J18.9  Pneumonia, unspecified organism  DELROY Dalton

 

 2020  R94.6  Abnormal results of thyroid function studies  DELROY Dalton

 

 2020  D64.9  Anemia, unspecified  DERLOY Dalton

 

 2020  E03.9  Hypothyroidism, unspecified  DELROY Dalton

 

 2020  C73  Malignant neoplasm of thyroid gland  DELROY Dalton

 

 2020  J96.01  Acute respiratory failure with hypoxia  Kourtney Hutchins, NP

 

 2020  J18.0  Bronchopneumonia, unspecified organism  Kourtney Hutchins, NP

 

 2020  E03.9  Hypothyroidism, unspecified  Kourtney Hutchins, NP

 

 2020  F10.10  Alcohol abuse, uncomplicated  Kourtney Hutchins, NP

 

 2020  D64.9  Anemia, unspecified  Kourtney Hutchins, NP

 

 2020  J96.01  Acute respiratory failure with hypoxia  DELROY Scott

 

 2020  A41.9  Sepsis, unspecified organism  DELROY Scott

 

 2020  E03.9  Hypothyroidism, unspecified  DELROY Scott







Plan of Treatment

No Information Available



Functional Status







 Description

 

 No Information Available







Mental Status







 Description

 

 No Information Available







Referrals







 Description

 

 No Information Available

## 2020-04-15 NOTE — XMS REPORT
Continuity of Care Document (CCD)

 Created on:2020



Patient:Hesham Llanos

Sex:Male

:1955

External Reference #:[id number]





Demographics







 Address  [address] [id number]

 

 Mobile Phone  [phone]

 

 Preferred Language  Unknown

 

 Marital Status  Unknown

 

 Anabaptist Affiliation  Unknown

 

 Race  Unknown

 

 Ethnic Group  Unknown









Author







 Name  DELROY Dalton (transmitted by agent of provider Angelika Starkey)

 

 Address  [address]



   Unavailable



   [place] [address]









Problems







 Description

 

 No Information Available







Social History







 Type  Date  Description  Comments

 

 Birth Sex    Unknown  







Allergies, Adverse Reactions, Alerts







 Description

 

 No Information Available







Medications







 Description

 

 No Information Available







Immunizations







 Description

 

 No Information Available







Vital Signs







 Description

 

 No Information Available







Results







 Description

 

 No Information Available







Procedures







 Description

 

 No Information Available







Medical Devices







 Description

 

 No Information Available







Encounters







 Type  Date  Location  Provider  Dx  Diagnosis

 

 Office Visit  2020  [facility]  Jennifer  J18.9  Pneumonia,



   10:03a  Assoc,DELROY Wagner    unspecified



     Hospitalists      organism









 R94.6  Abnormal results of thyroid function studies

 

 D64.9  Anemia, unspecified

 

 E03.9  Hypothyroidism, unspecified

 

 C73  Malignant neoplasm of thyroid gland









 Office Visit  2020 10:01a  [facility]  Jack  J96.01  Acute 
respiratory



     Assoc,DELROY Alexis    failure with



     Hospitalists      hypoxia









 A41.9  Sepsis, unspecified organism

 

 E03.9  Hypothyroidism, unspecified







Assessments







 Date  Code  Description  Provider

 

 2020  J18.9  Pneumonia, unspecified organism  Mallory Leon M.D.

 

 2020  J96.91  Respiratory failure, unspecified with hypoxia  Mallory Leon M.D.

 

 2020  E03.9  Hypothyroidism, unspecified  Mallory Leon M.D.

 

 2020  J18.9  Pneumonia, unspecified organism  DELROY Dalton

 

 2020  R94.6  Abnormal results of thyroid function studies  DELROY Dalton

 

 2020  D64.9  Anemia, unspecified  DELROY Dalton

 

 2020  E03.9  Hypothyroidism, unspecified  DELROY Dalton

 

 2020  C73  Malignant neoplasm of thyroid gland  DELROY Dalton

 

 2020  J96.01  Acute respiratory failure with hypoxia  Kourtney Hutchins, NP

 

 2020  J18.0  Bronchopneumonia, unspecified organism  Kourtney Hutchins, NP

 

 2020  E03.9  Hypothyroidism, unspecified  Kourtney Hutchins, NP

 

 2020  F10.10  Alcohol abuse, uncomplicated  Kourtney Hutchins, NP

 

 2020  D64.9  Anemia, unspecified  Kourtney Hutchins, NP

 

 2020  J96.01  Acute respiratory failure with hypoxia  DELROY Scott

 

 2020  A41.9  Sepsis, unspecified organism  DELROY Scott

 

 2020  E03.9  Hypothyroidism, unspecified  DELROY Scott







Plan of Treatment

No Information Available



Functional Status







 Description

 

 No Information Available







Mental Status







 Description

 

 No Information Available







Referrals







 Description

 

 No Information Available

## 2020-04-15 NOTE — ED
Shortness of Breath





- HPI Summary


HPI Summary: 





This patient is a 65-year-old otherwise healthy male presenting to the ED with 

shortness of breath, cough with production, congestion.   Patient has been seen 

in the ED for same 4.  Admitted twice.  Patient has been on 3 rounds of 

antibiotics with minimal improvement. However, now pt states this is definitely 

worse.   He states typically he will improve for a few days and then worsen.  

He has been struggling to breathe at home.  He has been using inhalers.  He has 

not COPD, asthma, SOB hx.  Has had PNA once 2 years ago.   NO cardiac hx.  Has 

had CT chest 1 mo ago which showed a small PNA.  Arrives in resp distress on 

15L with a sat of 92%.





- History of Current Complaint


Chief Complaint: EDShortnessOfBreath


Time Seen by Provider: 04/15/20 10:51


Hx Obtained From: Patient


Onset/Duration: Gradual Onset, Still Present, Worse Since - 3-4 days ago


Timing: Constant


Current Severity: Severe


Associated Signs & Symptoms: Cough (Productive), Fever, Chills, Diaphoresis





- Allergy/Home Medications


Allergies/Adverse Reactions: 


 Allergies











Allergy/AdvReac Type Severity Reaction Status Date / Time


 


No Known Allergies Allergy   Verified 04/15/20 11:04











Home Medications: 


 Home Medications





Levothyroxine TAB* [Synthroid 125 MCG TAB*] 125 mcg PO 0600 #120 tab 03/09/20 [

Rx Confirmed 04/15/20]


Albuterol HFA INHALER* [Ventolin HFA Inhaler*] 2 puff INH Q4H PRN 30 Days #1 

mdi 03/15/20 [Rx Confirmed 04/15/20]


Loratadine [Claritin] 10 mg PO DAILY 03/19/20 [History Confirmed 04/15/20]


Thiamine Mononitrate (Vit B1) [Vitamin B-1] 100 mg PO DAILY 04/15/20 [History 

Confirmed 04/15/20]











PMH/Surg Hx/FS Hx/Imm Hx


Previously Healthy: Yes


Endocrine/Hematology History: Reports: Hx Thyroid Disease


   Denies: Hx Diabetes


Cardiovascular History: 


   Denies: Hx Deep Vein Thrombosis, Hx Hypertension


Respiratory History: Reports: Hx Pneumonia


   Denies: Hx Asthma, Hx Chronic Obstructive Pulmonary Disease (COPD), Hx 

Pulmonary Edema


Sensory History: 


   Denies: Hx Contacts or Glasses, Hx Hearing Aid


Opthamlomology History: 


   Denies: Hx Contacts or Glasses





- Cancer History


Cancer Type, Location and Year: thyroid





- Surgical History


Surgery Procedure, Year, and Place: tonsillectomy, corrective eye surgery





- Immunization History


Hx Pertussis Vaccination: No


Immunizations Up to Date: Yes


Infectious Disease History: No


Infectious Disease History: 


   Denies: Traveled Outside the [place] in Last 30 Days





- Family History


Known Family History: Positive: Cardiac Disease





- Social History


Occupation: Employed Full-time


Lives: With Family


Alcohol Use: Rare


Alcohol Amount: 5 beers a day


Hx Substance Use: No


Substance Use Type: Reports: None


Hx Tobacco Use: No


Smoking Status (MU): Never Smoked Tobacco





Review of Systems


Negative: Fever, Chills, Fatigue, Skin Diaphoresis


Negative: Palpitations, Chest Pain


Positive: Shortness Of Breath, Cough


Genitourinary: Negative


Positive: no symptoms reported, see HPI


Negative: Arthralgia, Myalgia


All Other Systems Reviewed And Are Negative: Yes





Physical Exam


Triage Information Reviewed: Yes


Vital Signs On Initial Exam: 


 Initial Vitals











Temp Pulse Resp BP Pulse Ox


 


 98.2 F   103   32   117/74   96 


 


 04/15/20 10:58  04/15/20 10:58  04/15/20 10:58  04/15/20 10:58  04/15/20 10:58











Vital Signs Reviewed: Yes


Appearance: Positive: Ill-Appearing - resp distress


Skin: Positive: Dry


Neck: Positive: Supple, No Lymphadenopathy


Respiratory/Lung Sounds: Positive: Decreased Breath Sounds


Cardiovascular: Positive: Pulses are Symmetrical in both Upper and Lower 

Extremities


Musculoskeletal: Positive: Strength/ROM Intact


Neurological: Positive: Sensory/Motor Intact, Alert, Oriented to Person Place, 

Time


Psychiatric: Positive: Affect/Mood Appropriate





Procedures





- Sedation


Patient Received Moderate/Deep Sedation with Procedure: No





Diagnostics





- Vital Signs


 Vital Signs











  Temp Pulse Resp BP Pulse Ox


 


 04/15/20 12:28   99  35  116/77  98


 


 04/15/20 12:06   97  37   94


 


 04/15/20 11:58   104  19  125/78  94


 


 04/15/20 10:58  98.2 F  103  32  117/74  96














- Laboratory


Lab Results: 


 Lab Results











  04/15/20 04/15/20 04/15/20 Range/Units





  11:10 11:10 11:10 


 


WBC  19.4 H    (3.5-10.8)  10^3/uL


 


RBC  4.29    (4.18-5.48)  10^6 /uL


 


Hgb  13.2 L    (14.0-18.0)  g/dL


 


Hct  40 L    (42-52)  %


 


MCV  93    (80-94)  fL


 


MCH  31    (27-31)  pg


 


MCHC  33    (31-36)  g/dL


 


RDW  14    (10-15)  %


 


Plt Count  520 H    (150-450)  10^3/uL


 


MPV  7.8    (7.4-10.4)  fL


 


Neut % (Auto)  86.0    %


 


Lymph % (Auto)  5.9    %


 


Mono % (Auto)  6.0    %


 


Eos % (Auto)  1.7    %


 


Baso % (Auto)  0.4    %


 


Absolute Neuts (auto)  16.7 H    (1.5-7.7)  10^3/ul


 


Absolute Lymphs (auto)  1.1    (1.0-4.8)  10^3/ul


 


Absolute Monos (auto)  1.2 H    (0-0.8)  10^3/ul


 


Absolute Eos (auto)  0.3    (0-0.6)  10^3/ul


 


Absolute Basos (auto)  0.1    (0-0.2)  10^3/ul


 


Absolute Nucleated RBC  0.0    10^3/ul


 


Nucleated RBC %  0.0    


 


Sodium   131 L   (135-145)  mmol/L


 


Potassium   4.0   (3.5-5.0)  mmol/L


 


Chloride   99 L   (101-111)  mmol/L


 


Carbon Dioxide   23   (22-32)  mmol/L


 


Anion Gap   9   (2-11)  mmol/L


 


BUN   16   (6-24)  mg/dL


 


Creatinine   1.05   (0.67-1.17)  mg/dL


 


Est GFR ( Amer)   86.3   (>60)  


 


Est GFR (Non-Af Amer)   71.3   (>60)  


 


BUN/Creatinine Ratio   15.2   (8-20)  


 


Glucose   95   ()  mg/dL


 


Lactic Acid    1.1  (0.5-2.0)  mmol/L


 


Calcium   9.5   (8.6-10.3)  mg/dL


 


Total Bilirubin   0.80   (0.2-1.0)  mg/dL


 


AST   12 L   (13-39)  U/L


 


ALT   9   (7-52)  U/L


 


Alkaline Phosphatase   76   ()  U/L


 


Troponin I   0.11 H*   (<0.03)  ng/mL


 


C-Reactive Protein   155.06 H   (<8.01)  mg/L


 


Total Protein   7.8   (6.4-8.9)  g/dL


 


Albumin   3.8   (3.2-5.2)  g/dL


 


Globulin   4.0   (2-4)  g/dL


 


Albumin/Globulin Ratio   1.0   (1-3)  











Result Diagrams: 


 04/15/20 11:10





 04/15/20 11:10


Lab Statement: Any lab studies that have been ordered have been reviewed, and 

results considered in the medical decision making process.





Course/Dx





- Course


Course Of Treatment: This patient is a 65-year-old otherwise healthy male 

presenting to the ED with shortness of breath, cough with production, 

congestion.   Patient has been seen in the ED for same 4.  Admitted twice.  

Patient has been on 3 rounds of antibiotics with minimal improvement.  He 

states typically he will improve for a few days and then worsen.  He has been 

struggling to breathe at home.  He has been using inhalers.  He has not COPD, 

asthma, SOB hx.  Has had PNA once 2 years ago.   NO cardiac hx.  Has had CT 

chest 1 mo ago which showed a small PNA.  Arrives in resp distress on 15L with 

a sat of 92%.  EKG shows NSR.  Trop 0.00.  WBC 19,000 with a L shift, CRP of 155

, sodium 131.  Titrated O2.  85% on room air.  Pt at 92% on non-rebreather.   

Pt likely has PNA.  Given Levaquin and cefepime.  Sputum ordered.  Legionella 

and strep antigens pending.  Tested for COVID and was negative.  Pt states he 

feels so SOB that he becomes dizzy and lightheadedness.   He has been around 

anyone else sick.  Lives with wife who is not ill.  Discussed with hospitalist 

group who will admit for PNA and hypoxia.  Pt is again tested for COVID.  

Precautions were taken.





- Diagnoses


Differential Diagnosis/HQI/PQRI: Positive: Pneumonia


Provider Diagnoses: 


 PNA (pneumonia)








- Physician Notifications


Discussed Care of Patient With: Sharon Pantoja


Instructed by Provider To: Admit As Inpatient





- Critical Care Time


Critical Care Time: 30-74 min


Critical Care Statement: Critical care time is provided exclusive of any time 

spent performing procedures.





Discharge ED





- Sign-Out/Discharge


Documenting (check all that apply): Patient Departure





- Discharge Plan


Condition: Fair


Disposition: ADMITTED TO [facility]





- Billing Disposition and Condition


Condition: FAIR


Disposition: Admitted to [facility]





- Attestation Statements


Provider Attestation: 





I was available for consult. This patient was seen by the EJ. The patient was 

not presented to, seen by, or examined by me. Daniel Kan MD
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
## 2020-04-15 NOTE — XMS REPORT
Continuity of Care Document (CCD)

 Created on:2020



Patient:Hesham Llanos

Sex:Male

:1955

External Reference #:[id number]





Demographics







 Address  [address] [id number]

 

 Mobile Phone  [phone]

 

 Preferred Language  Unknown

 

 Marital Status  Unknown

 

 Confucianist Affiliation  Unknown

 

 Race  Unknown

 

 Ethnic Group  Unknown









Author







 Name  DELROY Scott (transmitted by agent of provider Angelika Starkey)

 

 Address  [address]



   Unavailable



   [place] [address]









Problems







 Description

 

 No Information Available







Social History







 Type  Date  Description  Comments

 

 Birth Sex    Unknown  







Allergies, Adverse Reactions, Alerts







 Description

 

 No Information Available







Medications







 Description

 

 No Information Available







Immunizations







 Description

 

 No Information Available







Vital Signs







 Description

 

 No Information Available







Results







 Description

 

 No Information Available







Procedures







 Description

 

 No Information Available







Medical Devices







 Description

 

 No Information Available







Encounters







 Type  Date  Location  Provider  Dx  Diagnosis

 

 Office Visit  2020  [facility]  Jennifer  J18.9  Pneumonia,



   10:03a  Assoc,DELROY Wagner    unspecified



     Hospitalists      organism









 R94.6  Abnormal results of thyroid function studies

 

 D64.9  Anemia, unspecified

 

 E03.9  Hypothyroidism, unspecified

 

 C73  Malignant neoplasm of thyroid gland









 Office Visit  2020 10:01a  [facility]  Jack  J96.01  Acute 
respiratory



     Assoc,DELROY Alexis    failure with



     Hospitalists      hypoxia









 A41.9  Sepsis, unspecified organism

 

 E03.9  Hypothyroidism, unspecified







Assessments







 Date  Code  Description  Provider

 

 2020  J18.9  Pneumonia, unspecified organism  Mallory Leon M.D.

 

 2020  J96.91  Respiratory failure, unspecified with hypoxia  Mallory Leon M.D.

 

 2020  E03.9  Hypothyroidism, unspecified  Mallory Leon M.D.

 

 2020  J18.9  Pneumonia, unspecified organism  DELROY Dalton

 

 2020  R94.6  Abnormal results of thyroid function studies  DELROY Dalton

 

 2020  D64.9  Anemia, unspecified  DELROY Dalton

 

 2020  E03.9  Hypothyroidism, unspecified  DELROY Dalton

 

 2020  C73  Malignant neoplasm of thyroid gland  DELROY Dalton

 

 2020  J96.01  Acute respiratory failure with hypoxia  Kourtney Hutchins, NP

 

 2020  J18.0  Bronchopneumonia, unspecified organism  Kourtney Hutchins, NP

 

 2020  E03.9  Hypothyroidism, unspecified  Kourtney Hutchins, NP

 

 2020  F10.10  Alcohol abuse, uncomplicated  Kourtney Hutchins, NP

 

 2020  D64.9  Anemia, unspecified  Kourtney Hutchins, NP

 

 2020  J96.01  Acute respiratory failure with hypoxia  Jack Wallace, PA

 

 2020  A41.9  Sepsis, unspecified organism  Jack Wallace, PA

 

 2020  E03.9  Hypothyroidism, unspecified  DELROY Scott







Plan of Treatment

No Information Available



Functional Status







 Description

 

 No Information Available







Mental Status







 Description

 

 No Information Available







Referrals







 Description

 

 No Information Available

## 2020-04-15 NOTE — XMS REPORT
Continuity of Care Document (CCD)

 Created on:2020



Patient:Hesham Llanos

Sex:Male

:1955

External Reference #:[id number]





Demographics







 Address  [address] [id number]

 

 Mobile Phone  [phone]

 

 Preferred Language  Unknown

 

 Marital Status  Unknown

 

 Zoroastrian Affiliation  Unknown

 

 Race  Unknown

 

 Ethnic Group  Unknown









Author







 Name  Anders Park M.D. (transmitted by agent of provider Angelika Starkey)

 

 Address  [address]



   Unavailable



   [place] [address]









Problems







 Description

 

 No Information Available







Social History







 Type  Date  Description  Comments

 

 Birth Sex    Unknown  







Allergies, Adverse Reactions, Alerts







 Description

 

 No Information Available







Medications







 Description

 

 No Information Available







Immunizations







 Description

 

 No Information Available







Vital Signs







 Description

 

 No Information Available







Results







 Description

 

 No Information Available







Procedures







 Description

 

 No Information Available







Medical Devices







 Description

 

 No Information Available







Encounters







 Type  Date  Location  Provider  Dx  Diagnosis

 

 Office Visit  03/15/2020  [facility]  Anders  J18.0  Bronchopneumonia,



   10:49a  angelic Rivers M.D.    unspecified



     Hospitalists      organism









 J96.01  Acute respiratory failure with hypoxia

 

 F10.20  Alcohol dependence, uncomplicated

 

 E03.9  Hypothyroidism, unspecified









 Office  2020  [facility]  Anders  J18.0  Bronchopneumonia,



 Visit  10:49a  angelic Rivers unspecified organism



     Ha JACINTO    









 F10.10  Alcohol abuse, uncomplicated

 

 E03.9  Hypothyroidism, unspecified









 Office Visit  2020  [facility]  Mallory Leon  J18.9  Pneumonia,



   10:48a  Assangelic madera M.D.    unspecified



     Hospitalists      organism









 J96.91  Respiratory failure, unspecified with hypoxia

 

 E03.9  Hypothyroidism, unspecified









 Office Visit  2020  [facility]  Jennifer  J18.9  Pneumonia,



   10:03a  Assocangelic PA    unspecified



     Hospitalists      organism









 R94.6  Abnormal results of thyroid function studies

 

 D64.9  Anemia, unspecified

 

 E03.9  Hypothyroidism, unspecified

 

 C73  Malignant neoplasm of thyroid gland









 Office Visit  2020 10:01a  [facility]  Jack  J96.01  Acute 
respiratory



     Assoc,DELROY Alexis    failure with



     Hospitalists      hypoxia









 A41.9  Sepsis, unspecified organism

 

 E03.9  Hypothyroidism, unspecified







Assessments







 Date  Code  Description  Provider

 

 03/15/2020  J18.0  Bronchopneumonia, unspecified organism  Anders PichardoMARYBETH garcia.D.

 

 03/15/2020  J96.01  Acute respiratory failure with hypoxia  Andersmildred PichardoMARYBETH garcia.D.

 

 03/15/2020  F10.20  Alcohol dependence, uncomplicated  Anders Park, M.D.

 

 03/15/2020  E03.9  Hypothyroidism, unspecified  Andersmildred Tuckerrosaem, M.D.

 

 2020  J18.0  Bronchopneumonia, unspecified organism  Anders Pichardojose, 
M.D.

 

 2020  F10.10  Alcohol abuse, uncomplicated  Anders Park, M.D.

 

 2020  E03.9  Hypothyroidism, unspecified  Andersmildred Tuckerjim, M.D.

 

 2020  J18.9  Pneumonia, unspecified organism  Mallory Leon M.D.

 

 2020  J96.91  Respiratory failure, unspecified with  Mallory Leon M.D.



     hypoxia  

 

 2020  E03.9  Hypothyroidism, unspecified  Mallory Leon M.D.

 

 2020  J18.9  Pneumonia, unspecified organism  DELROY Dalton

 

 2020  R94.6  Abnormal results of thyroid function  DELROY Dalton



     studies  

 

 2020  D64.9  Anemia, unspecified  DELROY Dalton

 

 2020  E03.9  Hypothyroidism, unspecified  DELROY Dalton

 

 2020  C73  Malignant neoplasm of thyroid gland  DELROY Dalton

 

 2020  J96.01  Acute respiratory failure with hypoxia  Kourtney Hutchins, NP

 

 2020  J18.0  Bronchopneumonia, unspecified organism  Kourtney Hutchins, NP

 

 2020  E03.9  Hypothyroidism, unspecified  Kourtney Hutchins, NP

 

 2020  F10.10  Alcohol abuse, uncomplicated  Kourtney Hutchins, NP

 

 2020  D64.9  Anemia, unspecified  Kourtney Hutchins, NP

 

 2020  J96.01  Acute respiratory failure with hypoxia  DELROY Scott

 

 2020  A41.9  Sepsis, unspecified organism  DELROY Scott

 

 2020  E03.9  Hypothyroidism, unspecified  DELROY Scott







Plan of Treatment

No Information Available



Functional Status







 Description

 

 No Information Available







Mental Status







 Description

 

 No Information Available







Referrals







 Description

 

 No Information Available

## 2020-04-15 NOTE — XMS REPORT
Continuity of Care Document (CCD)

 Created on:2020



Patient:Hesham Llanos

Sex:Male

:1955

External Reference #:[id number]





Demographics







 Address  [address] [id number]

 

 Mobile Phone  [phone]

 

 Preferred Language  Unknown

 

 Marital Status  Unknown

 

 Sabianism Affiliation  Unknown

 

 Race  Unknown

 

 Ethnic Group  Unknown









Author







 Name  Qutaybeh S. Maghaydah, M.D. (transmitted by agent of provider Keely Sparks)

 

 Address  [address]



   Unavailable



   [place] [address]









Problems







 Description

 

 No Information Available







Social History







 Type  Date  Description  Comments

 

 Birth Sex    Unknown  







Allergies, Adverse Reactions, Alerts







 Description

 

 No Information Available







Medications







 Description

 

 No Information Available







Immunizations







 Description

 

 No Information Available







Vital Signs







 Description

 

 No Information Available







Results







 Description

 

 No Information Available







Procedures







 Date  Code  Description  Status

 

 2020  02885  ECHO Transthorasic Realtime 2D W Doppler & Color Flow Hosp  
Completed







Medical Devices







 Description

 

 No Information Available







Encounters







 Type  Date  Location  Provider  Dx  Diagnosis

 

 [facility]  J18.0  Bronchopneumonia,



   10:49a  angelic Rivers M.D.    unspecified



     Hospitalists      organism









 J96.01  Acute respiratory failure with hypoxia

 

 F10.20  Alcohol dependence, uncomplicated

 

 E03.9  Hypothyroidism, unspecified









 [facility]  J18.0  Bronchopneumonia,



 Visit  10:49a  angelic Rivers    unspecified organism



     Ha JACINTO    









 F10.10  Alcohol abuse, uncomplicated

 

 E03.9  Hypothyroidism, unspecified









 Office Visit  2020  [facility]  Mallory Leon  J18.9  Pneumonia,



   10:48a  angelic Rivers M.D.    unspecified



     Hospitalists      organism









 J96.91  Respiratory failure, unspecified with hypoxia

 

 E03.9  Hypothyroidism, unspecified









 Office Visit  2020  [facility]  Jennifer  J18.9  Pneumonia,



   10:03a  Assocangelic PA    unspecified



     Hospitalists      organism









 R94.6  Abnormal results of thyroid function studies

 

 D64.9  Anemia, unspecified

 

 E03.9  Hypothyroidism, unspecified

 

 C73  Malignant neoplasm of thyroid gland









 Office Visit  2020 10:01a  [facility]  J96.01  Acute 
respiratory



     Assoc,DELROY Alexis    failure with



     Hospitalists      hypoxia









 A41.9  Sepsis, unspecified organism

 

 E03.9  Hypothyroidism, unspecified







Assessments







 Date  Code  Description  Provider

 

 03/15/2020  J18.0  Bronchopneumonia, unspecified organism  Anders Moeric, 
M.DLauren

 

 03/15/2020  J96.01  Acute respiratory failure with hypoxia  MARYBETH Stevens.DLauren

 

 03/15/2020  F10.20  Alcohol dependence, uncomplicated  Anders Moussallem, M.D.

 

 03/15/2020  E03.9  Hypothyroidism, unspecified  Anders Moussallem, M.DLauren

 

 2020  J18.0  Bronchopneumonia, unspecified organism  Anders Momunaallem, 
M.DLauren

 

 2020  F10.10  Alcohol abuse, uncomplicated  Anders Moussallem, M.DLauren

 

 2020  E03.9  Hypothyroidism, unspecified  Anders Moussallem, M.DLauren

 

 2020  R06.02  Shortness of breath  Qutaybeh S. Maghaydah, M.D.

 

 2020  J18.9  Pneumonia, unspecified organism  Mallory Leon M.D.

 

 2020  J96.91  Respiratory failure, unspecified with  Mallory Leon M.D.



     hypoxia  

 

 2020  E03.9  Hypothyroidism, unspecified  Mallory Leon M.D.

 

 2020  J18.9  Pneumonia, unspecified organism  DELROY Dalton

 

 2020  R94.6  Abnormal results of thyroid function  DELROY Dalton



     studies  

 

 2020  D64.9  Anemia, unspecified  DELROY Dalton

 

 2020  E03.9  Hypothyroidism, unspecified  DELROY Dalton

 

 2020  C73  Malignant neoplasm of thyroid gland  DELROY Dalton

 

 2020  J96.01  Acute respiratory failure with hypoxia  Kourtney Hutchins, NP

 

 2020  J18.0  Bronchopneumonia, unspecified organism  Kourtney Hutchins, NP

 

 2020  E03.9  Hypothyroidism, unspecified  Kourtney Hutchins, NP

 

 2020  F10.10  Alcohol abuse, uncomplicated  Kourtney Hutchins, NP

 

 2020  D64.9  Anemia, unspecified  Kourtney Hutchins, NP

 

 2020  J96.01  Acute respiratory failure with hypoxia  DELROY Scott

 

 2020  A41.9  Sepsis, unspecified organism  DELROY Scott

 

 2020  E03.9  Hypothyroidism, unspecified  DELROY Scott







Plan of Treatment

No Information Available



Functional Status







 Description

 

 No Information Available







Mental Status







 Description

 

 No Information Available







Referrals







 Description

 

 No Information Available

## 2020-04-15 NOTE — XMS REPORT
Continuity of Care Document (CCD)

 Created on:2020



Patient:Hesham Llanos

Sex:Male

:1955

External Reference #:[id number]





Demographics







 Address  [address] [id number]

 

 Mobile Phone  [phone]

 

 Preferred Language  Unknown

 

 Marital Status  Unknown

 

 Anabaptist Affiliation  Unknown

 

 Race  Unknown

 

 Ethnic Group  Unknown









Author







 Name  Mallory Leon M.D. (transmitted by agent of provider Angelika Starkey)

 

 Address  [address]



   Unavailable



   [place] [address]









Problems







 Description

 

 No Information Available







Social History







 Type  Date  Description  Comments

 

 Birth Sex    Unknown  







Allergies, Adverse Reactions, Alerts







 Description

 

 No Information Available







Medications







 Description

 

 No Information Available







Immunizations







 Description

 

 No Information Available







Vital Signs







 Description

 

 No Information Available







Results







 Description

 

 No Information Available







Procedures







 Description

 

 No Information Available







Medical Devices







 Description

 

 No Information Available







Encounters







 Type  Date  Location  Provider  Dx  Diagnosis

 

 Office Visit  2020  [facility]  Mallory Leon,  J18.9  Pneumonia,



   10:48a  Assocangelic M.D.    unspecified



     Hospitalists      organism









 J96.91  Respiratory failure, unspecified with hypoxia

 

 E03.9  Hypothyroidism, unspecified









 Office Visit  2020  [facility]  Jennifer  J18.9  Pneumonia,



   10:03a  Assoc,DELROY Wagner    unspecified



     Hospitalists      organism









 R94.6  Abnormal results of thyroid function studies

 

 D64.9  Anemia, unspecified

 

 E03.9  Hypothyroidism, unspecified

 

 C73  Malignant neoplasm of thyroid gland









 Office Visit  2020 10:01a  [facility]  Jack  J96.01  Acute 
respiratory



     Assoc,DELROY Alexis    failure with



     Hospitalists      hypoxia









 A41.9  Sepsis, unspecified organism

 

 E03.9  Hypothyroidism, unspecified







Assessments







 Date  Code  Description  Provider

 

 2020  J18.0  Bronchopneumonia, unspecified organism  Anders Park M.D.

 

 2020  F10.10  Alcohol abuse, uncomplicated  Anders Park M.D.

 

 2020  E03.9  Hypothyroidism, unspecified  Anders Park M.D.

 

 2020  J18.9  Pneumonia, unspecified organism  Mallory Leon M.D.

 

 2020  J96.91  Respiratory failure, unspecified with  Mallory Leon M.D.



     hypoxia  

 

 2020  E03.9  Hypothyroidism, unspecified  Mallory Leon M.D.

 

 2020  J18.9  Pneumonia, unspecified organism  DELROY Dalton

 

 2020  R94.6  Abnormal results of thyroid function  DELROY Dalton



     studies  

 

 2020  D64.9  Anemia, unspecified  DELROY Dalton

 

 2020  E03.9  Hypothyroidism, unspecified  DELROY Dalton

 

 2020  C73  Malignant neoplasm of thyroid gland  DELROY Dalton

 

 2020  J96.01  Acute respiratory failure with hypoxia  Kourtney Hutchins, NP

 

 2020  J18.0  Bronchopneumonia, unspecified organism  Kourtney Hutchins, NP

 

 2020  E03.9  Hypothyroidism, unspecified  Kourtney Hutchins, NP

 

 2020  F10.10  Alcohol abuse, uncomplicated  Kourtney Hutchins, NP

 

 2020  D64.9  Anemia, unspecified  Kourtney Hutchins, NP

 

 2020  J96.01  Acute respiratory failure with hypoxia  DELROY Scott

 

 2020  A41.9  Sepsis, unspecified organism  DELROY Scott

 

 2020  E03.9  Hypothyroidism, unspecified  DELROY Scott







Plan of Treatment

No Information Available



Functional Status







 Description

 

 No Information Available







Mental Status







 Description

 

 No Information Available







Referrals







 Description

 

 No Information Available

## 2020-04-16 LAB
ANION GAP SERPL CALC-SCNC: 9 MMOL/L (ref 2–11)
BASOPHILS # BLD AUTO: 0.1 10^3/UL (ref 0–0.2)
BUN SERPL-MCNC: 19 MG/DL (ref 6–24)
BUN/CREAT SERPL: 23.2 (ref 8–20)
CALCIUM SERPL-MCNC: 9.1 MG/DL (ref 8.6–10.3)
CHLORIDE SERPL-SCNC: 105 MMOL/L (ref 101–111)
EOSINOPHIL # BLD AUTO: 0 10^3/UL (ref 0–0.6)
GLUCOSE SERPL-MCNC: 158 MG/DL (ref 70–100)
HCO3 SERPL-SCNC: 19 MMOL/L (ref 22–32)
HCT VFR BLD AUTO: 39 % (ref 42–52)
HGB BLD-MCNC: 12.9 G/DL (ref 14–18)
LYMPHOCYTES # BLD AUTO: 0.9 10^3/UL (ref 1–4.8)
MAGNESIUM SERPL-MCNC: 2.1 MG/DL (ref 1.9–2.7)
MCH RBC QN AUTO: 31 PG (ref 27–31)
MCHC RBC AUTO-ENTMCNC: 34 G/DL (ref 31–36)
MCV RBC AUTO: 94 FL (ref 80–94)
MONOCYTES # BLD AUTO: 0.4 10^3/UL (ref 0–0.8)
NEUTROPHILS # BLD AUTO: 11.6 10^3/UL (ref 1.5–7.7)
NRBC # BLD AUTO: 0 10^3/UL
NRBC BLD QL AUTO: 0
PLATELET # BLD AUTO: 456 10^3/UL (ref 150–450)
POTASSIUM SERPL-SCNC: 4.3 MMOL/L (ref 3.5–5)
RBC # BLD AUTO: 4.11 10^6 /UL (ref 4.18–5.48)
SODIUM SERPL-SCNC: 133 MMOL/L (ref 135–145)
WBC # BLD AUTO: 12.9 10^3/UL (ref 3.5–10.8)

## 2020-04-16 RX ADMIN — METHYLPREDNISOLONE SODIUM SUCCINATE SCH MG: 125 INJECTION, POWDER, FOR SOLUTION INTRAMUSCULAR; INTRAVENOUS at 15:24

## 2020-04-16 RX ADMIN — CETIRIZINE HYDROCHLORIDE SCH MG: 10 TABLET, FILM COATED ORAL at 07:32

## 2020-04-16 RX ADMIN — GUAIFENESIN SCH MG: 600 TABLET, EXTENDED RELEASE ORAL at 20:25

## 2020-04-16 RX ADMIN — CEFEPIME HYDROCHLORIDE SCH MLS/HR: 2 INJECTION, POWDER, FOR SOLUTION INTRAVENOUS at 12:28

## 2020-04-16 RX ADMIN — LEVOTHYROXINE SODIUM SCH MCG: 125 TABLET ORAL at 05:06

## 2020-04-16 RX ADMIN — PANTOPRAZOLE SODIUM SCH MG: 40 TABLET, DELAYED RELEASE ORAL at 07:32

## 2020-04-16 RX ADMIN — DOXYCYCLINE SCH MLS/HR: 100 INJECTION, POWDER, LYOPHILIZED, FOR SOLUTION INTRAVENOUS at 15:25

## 2020-04-16 RX ADMIN — METHYLPREDNISOLONE SODIUM SUCCINATE SCH MG: 125 INJECTION, POWDER, FOR SOLUTION INTRAMUSCULAR; INTRAVENOUS at 22:50

## 2020-04-16 RX ADMIN — ENOXAPARIN SODIUM SCH MG: 40 INJECTION SUBCUTANEOUS at 15:24

## 2020-04-16 RX ADMIN — DOXYCYCLINE SCH MLS/HR: 100 INJECTION, POWDER, LYOPHILIZED, FOR SOLUTION INTRAVENOUS at 03:48

## 2020-04-16 RX ADMIN — Medication SCH MG: at 07:32

## 2020-04-16 RX ADMIN — METHYLPREDNISOLONE SODIUM SUCCINATE SCH MG: 125 INJECTION, POWDER, FOR SOLUTION INTRAMUSCULAR; INTRAVENOUS at 07:32

## 2020-04-16 RX ADMIN — GUAIFENESIN SCH MG: 600 TABLET, EXTENDED RELEASE ORAL at 07:32

## 2020-04-16 NOTE — PN
Subjective


Date of Service: 04/16/20


Interval History: 





Patient is feeling somewhat better today, but feels like most of his 

improvement is related to the oxygen and not the treatments. Patient denies F/C

, N/V, abdominal pain, diarrhea, or other pain. Patient is very anxious to get 

a diagnosis and is frustrated about not getting better. 


Family History: Unchanged from Admission


Social History: Unchanged from Admission


Past Medical History: Unchanged from Admission





Objective


Active Medications: 








Acetaminophen (Tylenol Tab*)  650 mg PO Q6H PRN


   PRN Reason: MILD PAIN or TEMP > 100.4


Albuterol (Ventolin Hfa Inhaler*)  2 puff INH Q4H PRN


   PRN Reason: SOB/WHEEZING


   Last Admin: 04/15/20 19:20 Dose:  2 puff


Benzonatate (Tessalon Cap*)  100 mg PO BID PRN


   PRN Reason: COUGH


   Last Admin: 04/15/20 20:50 Dose:  100 mg


Cetirizine HCl (Zyrtec*)  10 mg PO DAILY [facility]


   Last Admin: 04/16/20 07:32 Dose:  10 mg


Enoxaparin Sodium (Lovenox(*))  40 mg SUBCUT Q24H [facility]


   Last Admin: 04/15/20 18:11 Dose:  40 mg


Guaifenesin (Mucinex*)  1,200 mg PO BID [facility]


   Last Admin: 04/16/20 07:32 Dose:  1,200 mg


Cefepime HCl 2 gm/ Sodium (Chloride)  50 mls @ 100 mls/hr IVPB Q12H [facility]


   Last Admin: 04/15/20 23:53 Dose:  100 mls/hr


Doxycycline Hyclate 100 mg/ (Sodium Chloride)  250 mls @ 250 mls/hr IVPB Q12H 

[facility]


   Last Admin: 04/16/20 03:48 Dose:  250 mls/hr


Levothyroxine Sodium (Synthroid Tab*)  125 mcg PO DAILY@0600 [facility]


   Last Admin: 04/16/20 05:06 Dose:  125 mcg


Methylprednisolone Sodium Succinate (Solu-Medrol 125mg *)  60 mg IV Q8H [facility]


   Last Admin: 04/16/20 07:32 Dose:  60 mg


Ondansetron HCl (Zofran Inj*)  4 mg IV Q6H PRN


   PRN Reason: NAUSEA


Pantoprazole Sodium (Protonix Tab*)  40 mg PO DAILY [facility]


   Last Admin: 04/16/20 07:32 Dose:  40 mg


Thiamine HCl (Vitamin B-1 Tab*)  100 mg PO DAILY [facility]


   Last Admin: 04/16/20 07:32 Dose:  100 mg








 Vital Signs - 8 hr











  04/16/20 04/16/20 04/16/20





  03:48 07:15 07:47


 


Temperature 97.9 F 97.5 F 


 


Pulse Rate 81 78 


 


Respiratory 18 22 22





Rate   


 


Blood Pressure 103/74 122/74 





(mmHg)   


 


O2 Sat by Pulse 96 96 





Oximetry   











Oxygen Devices in Use Now: OxyMask


Appearance: Patient is a 64yo male who appears stated age and is sitting in the 

bed in NAD.


Eyes: No Scleral Icterus, PERRLA


Ears/Nose/Mouth/Throat: NL Teeth, Lips, Gums, Clear Oropharnyx, Mucous 

Membranes Moist


Neck: NL Appearance and Movements; NL JVP, Trachea Midline


Respiratory: Symmetrical Chest Expansion and Respiratory Effort, - - Rales only 

in RLL


Cardiovascular: NL Sounds; No Murmurs; No JVD, RRR, No Edema


Abdominal: NL Sounds; No Tenderness; No Distention, No Hepatosplenomegaly


Lymphatic: No Cervical Adenopathy


Extremities: No Edema, No Clubbing, Cyanosis, - - No palpable synovitis in 

small joints of the hands. 


Skin: No Rash or Ulcers, No Nodules or Sclerosis


Neurological: Alert and Oriented x 3, NL Sensation, NL Muscle Strength and Tone

, - - CN II-XII intact. 


Result Diagrams: 


 04/16/20 06:50





 04/16/20 06:50


Additional Lab and Data: 


 Lab Results











  04/15/20 04/15/20 04/15/20 Range/Units





  11:10 11:10 11:10 


 


WBC  19.4 H    (3.5-10.8)  10^3/uL


 


RBC  4.29    (4.18-5.48)  10^6 /uL


 


Hgb  13.2 L    (14.0-18.0)  g/dL


 


Hct  40 L    (42-52)  %


 


MCV  93    (80-94)  fL


 


MCH  31    (27-31)  pg


 


MCHC  33    (31-36)  g/dL


 


RDW  14    (10-15)  %


 


Plt Count  520 H    (150-450)  10^3/uL


 


MPV  7.8    (7.4-10.4)  fL


 


Neut % (Auto)  86.0    %


 


Lymph % (Auto)  5.9    %


 


Mono % (Auto)  6.0    %


 


Eos % (Auto)  1.7    %


 


Baso % (Auto)  0.4    %


 


Absolute Neuts (auto)  16.7 H    (1.5-7.7)  10^3/ul


 


Absolute Lymphs (auto)  1.1    (1.0-4.8)  10^3/ul


 


Absolute Monos (auto)  1.2 H    (0-0.8)  10^3/ul


 


Absolute Eos (auto)  0.3    (0-0.6)  10^3/ul


 


Absolute Basos (auto)  0.1    (0-0.2)  10^3/ul


 


Absolute Nucleated RBC  0.0    10^3/ul


 


Nucleated RBC %  0.0    


 


Sodium   131 L   (135-145)  mmol/L


 


Potassium   4.0   (3.5-5.0)  mmol/L


 


Chloride   99 L   (101-111)  mmol/L


 


Carbon Dioxide   23   (22-32)  mmol/L


 


Anion Gap   9   (2-11)  mmol/L


 


BUN   16   (6-24)  mg/dL


 


Creatinine   1.05   (0.67-1.17)  mg/dL


 


Est GFR ( Amer)   86.3   (>60)  


 


Est GFR (Non-Af Amer)   71.3   (>60)  


 


BUN/Creatinine Ratio   15.2   (8-20)  


 


Glucose   95   ()  mg/dL


 


Lactic Acid    1.1  (0.5-2.0)  mmol/L


 


Calcium   9.5   (8.6-10.3)  mg/dL


 


Total Bilirubin   0.80   (0.2-1.0)  mg/dL


 


AST   12 L   (13-39)  U/L


 


ALT   9   (7-52)  U/L


 


Alkaline Phosphatase   76   ()  U/L


 


Troponin I   0.11 H*   (<0.03)  ng/mL


 


C-Reactive Protein   155.06 H   (<8.01)  mg/L


 


Total Protein   7.8   (6.4-8.9)  g/dL


 


Albumin   3.8   (3.2-5.2)  g/dL


 


Globulin   4.0   (2-4)  g/dL


 


Albumin/Globulin Ratio   1.0   (1-3)  











Microbiology and Other Data: 


 Microbiology











 04/15/20 11:30 Gram Stain - Final





 Sputum Expectorated 


 


 04/15/20 13:02 Legionella Urinary Antigen - Final





 Urine    Negative Legionella Antigen





 Streptococcus pneumoniae Ag Screen - Final





    Negative S. pneumo Antigen














Assess/Plan/Problems-Billing


Assessment: 





Patient is a 64yo male with a PMH only for Hypothyroidism S/P thyroidectomy, 

here for his third admission in 2 months for shortness of breath currently 

being treated with high dose steroids and antibiotics for the possibility of 

ILD or treatment failure for pneumonia. 





- Patient Problems


(1) Acute respiratory failure with hypoxia


Current Visit: No   Status: Acute   Code(s): J96.01 - ACUTE RESPIRATORY FAILURE 

WITH HYPOXIA   SNOMED Code(s): 68576944


   Comment: 


- Originally was on 10L via face mask. Wean as Tolerated


- Unclear cause, concern for ILD vs Pneumonia with treatment failure


   





(2) Bronchopneumonia


Current Visit: No   Status: Acute   Code(s): J18.0 - BRONCHOPNEUMONIA, 

UNSPECIFIED ORGANISM   SNOMED Code(s): 374710075


   Comment: 


- Questionable diagnosis given treatment failure on multiple antibiotics


- Flu negative, COVID previously negative, Repeat pending


- On Cefepime and doxycycline for coverage of Pseudomonas(Previously on Levaquin

), MRSA and atypicals


- High dose steroids for concern for ILD


- CT chest when COVID ruled out.    





(3) ETOH abuse


Current Visit: No   Status: Acute   Code(s): F10.10 - ALCOHOL ABUSE, 

UNCOMPLICATED   SNOMED Code(s): 44101360


   Comment: 


- No longer drinking due to breathing issues


- States alcohol makes breathing worse   





(4) Full code status


Current Visit: No   Status: Acute   Code(s): Z78.9 - OTHER SPECIFIED HEALTH 

STATUS   SNOMED Code(s): 273395329


   





(5) Hypothyroid


Current Visit: No   Status: Acute   Code(s): E03.9 - HYPOTHYROIDISM, 

UNSPECIFIED   SNOMED Code(s): 05620346


   Comment: 


- TSH on low end of normal, continue 125mcg daily


- Follow up outpatient.    





(6) DVT prophylaxis


Current Visit: No   Status: Acute   Code(s): Z29.9 - ENCOUNTER FOR PROPHYLACTIC 

MEASURES, UNSPECIFIED   SNOMED Code(s): 166856262


   Comment: 


-Continue lovenox.   


Status and Disposition: 





Inpatient for respiratory failure.

## 2020-04-17 LAB
ANION GAP SERPL CALC-SCNC: 4 MMOL/L (ref 2–11)
BASOPHILS # BLD AUTO: 0 10^3/UL (ref 0–0.2)
BUN SERPL-MCNC: 24 MG/DL (ref 6–24)
BUN/CREAT SERPL: 27.6 (ref 8–20)
CALCIUM SERPL-MCNC: 8.7 MG/DL (ref 8.6–10.3)
CCP AB SER IA-ACNC: <15.6 U
CHLORIDE SERPL-SCNC: 109 MMOL/L (ref 101–111)
EOSINOPHIL # BLD AUTO: 0 10^3/UL (ref 0–0.6)
GLUCOSE SERPL-MCNC: 145 MG/DL (ref 70–100)
HCO3 SERPL-SCNC: 23 MMOL/L (ref 22–32)
HCT VFR BLD AUTO: 35 % (ref 42–52)
HGB BLD-MCNC: 11.7 G/DL (ref 14–18)
LYMPHOCYTES # BLD AUTO: 0.7 10^3/UL (ref 1–4.8)
Lab: 12.8 MG/L (ref ?–102)
MAGNESIUM SERPL-MCNC: 2.1 MG/DL (ref 1.9–2.7)
MCH RBC QN AUTO: 31 PG (ref 27–31)
MCHC RBC AUTO-ENTMCNC: 34 G/DL (ref 31–36)
MCV RBC AUTO: 93 FL (ref 80–94)
MONOCYTES # BLD AUTO: 0.8 10^3/UL (ref 0–0.8)
NEUTROPHILS # BLD AUTO: 17 10^3/UL (ref 1.5–7.7)
NRBC # BLD AUTO: 0 10^3/UL
NRBC BLD QL AUTO: 0
PLATELET # BLD AUTO: 464 10^3/UL (ref 150–450)
POTASSIUM SERPL-SCNC: 4.8 MMOL/L (ref 3.5–5)
RBC # BLD AUTO: 3.73 10^6 /UL (ref 4.18–5.48)
S RECTIVIRGULA IGG-MCNC: 7.2 MG/L (ref ?–13.2)
SODIUM SERPL-SCNC: 136 MMOL/L (ref 135–145)
WBC # BLD AUTO: 18.5 10^3/UL (ref 3.5–10.8)

## 2020-04-17 RX ADMIN — DOXYCYCLINE SCH MLS/HR: 100 INJECTION, POWDER, LYOPHILIZED, FOR SOLUTION INTRAVENOUS at 15:23

## 2020-04-17 RX ADMIN — GUAIFENESIN SCH MG: 600 TABLET, EXTENDED RELEASE ORAL at 20:47

## 2020-04-17 RX ADMIN — LEVOTHYROXINE SODIUM SCH MCG: 125 TABLET ORAL at 06:13

## 2020-04-17 RX ADMIN — METHYLPREDNISOLONE SODIUM SUCCINATE SCH MG: 125 INJECTION, POWDER, FOR SOLUTION INTRAMUSCULAR; INTRAVENOUS at 14:47

## 2020-04-17 RX ADMIN — ENOXAPARIN SODIUM SCH MG: 40 INJECTION SUBCUTANEOUS at 14:47

## 2020-04-17 RX ADMIN — METHYLPREDNISOLONE SODIUM SUCCINATE SCH MG: 125 INJECTION, POWDER, FOR SOLUTION INTRAMUSCULAR; INTRAVENOUS at 08:36

## 2020-04-17 RX ADMIN — CEFEPIME HYDROCHLORIDE SCH MLS/HR: 2 INJECTION, POWDER, FOR SOLUTION INTRAVENOUS at 13:34

## 2020-04-17 RX ADMIN — Medication SCH MG: at 08:35

## 2020-04-17 RX ADMIN — PANTOPRAZOLE SODIUM SCH MG: 40 TABLET, DELAYED RELEASE ORAL at 08:35

## 2020-04-17 RX ADMIN — GUAIFENESIN SCH MG: 600 TABLET, EXTENDED RELEASE ORAL at 08:35

## 2020-04-17 RX ADMIN — DOXYCYCLINE SCH MLS/HR: 100 INJECTION, POWDER, LYOPHILIZED, FOR SOLUTION INTRAVENOUS at 04:44

## 2020-04-17 RX ADMIN — CEFEPIME HYDROCHLORIDE SCH MLS/HR: 2 INJECTION, POWDER, FOR SOLUTION INTRAVENOUS at 00:47

## 2020-04-17 RX ADMIN — CETIRIZINE HYDROCHLORIDE SCH MG: 10 TABLET, FILM COATED ORAL at 08:35

## 2020-04-17 NOTE — PN
Subjective


Date of Service: 04/17/20


Interval History: 





Patient continue to report shortness of breath, worse with talking.  Denies 

chest pain.  Denies abd pain, n/v/d. Denies fever or chills.  





Patient was able to carry on conversation with completes sentences.  Does 

appear to be short of breath at times.   


reviewed CT report with patient.








Family History: Unchanged from Admission


Social History: Unchanged from Admission


Past Medical History: Unchanged from Admission





Objective


Active Medications: 








Acetaminophen (Tylenol Tab*)  650 mg PO Q6H PRN


   PRN Reason: MILD PAIN or TEMP > 100.4


Albuterol (Ventolin Hfa Inhaler*)  2 puff INH Q4H PRN


   PRN Reason: SOB/WHEEZING


   Last Admin: 04/15/20 19:20 Dose:  2 puff


Benzonatate (Tessalon Cap*)  100 mg PO BID PRN


   PRN Reason: COUGH


   Last Admin: 04/15/20 20:50 Dose:  100 mg


Cetirizine HCl (Zyrtec*)  10 mg PO DAILY [facility]


   Last Admin: 04/17/20 08:35 Dose:  10 mg


Enoxaparin Sodium (Lovenox(*))  40 mg SUBCUT Q24H [facility]


   Last Admin: 04/17/20 14:47 Dose:  40 mg


Guaifenesin (Mucinex*)  1,200 mg PO BID [facility]


   Last Admin: 04/17/20 08:35 Dose:  1,200 mg


Cefepime HCl 2 gm/ Sodium (Chloride)  50 mls @ 100 mls/hr IVPB Q12H [facility]


   Last Admin: 04/17/20 13:34 Dose:  100 mls/hr


Doxycycline Hyclate 100 mg/ (Sodium Chloride)  250 mls @ 250 mls/hr IVPB Q12H 

[facility]


   Last Admin: 04/17/20 15:23 Dose:  250 mls/hr


Levothyroxine Sodium (Synthroid Tab*)  125 mcg PO DAILY@0600 [facility]


   Last Admin: 04/17/20 06:13 Dose:  125 mcg


Methylprednisolone Sodium Succinate (Solu-Medrol 125mg *)  60 mg IV Q8H [facility]


   Last Admin: 04/17/20 14:47 Dose:  60 mg


Ondansetron HCl (Zofran Inj*)  4 mg IV Q6H PRN


   PRN Reason: NAUSEA


Pantoprazole Sodium (Protonix Tab*)  40 mg PO DAILY [facility]


   Last Admin: 04/17/20 08:35 Dose:  40 mg


Thiamine HCl (Vitamin B-1 Tab*)  100 mg PO DAILY [facility]


   Last Admin: 04/17/20 08:35 Dose:  100 mg








 Vital Signs - 8 hr











  04/17/20





  15:15


 


Temperature 98 F


 


Pulse Rate 74


 


Respiratory 16





Rate 


 


Blood Pressure 113/62





(mmHg) 


 


O2 Sat by Pulse 93





Oximetry 











Oxygen Devices in Use Now: Nasal Cannula


Appearance: alert, oriented x3 , well developed mal, no acute distress.  


Eyes: No Scleral Icterus


Ears/Nose/Mouth/Throat: Clear Oropharnyx, Mucous Membranes Moist


Neck: NL Appearance and Movements; NL JVP, Trachea Midline


Respiratory: Symmetrical Chest Expansion and Respiratory Effort, - - diminshed 

breath sounds bilat


Cardiovascular: NL Sounds; No Murmurs; No JVD, No Edema


Abdominal: NL Sounds; No Tenderness; No Distention


Extremities: No Edema, No Clubbing, Cyanosis


Skin: No Rash or Ulcers


Neurological: Alert and Oriented x 3


Nutrition: Taking PO's


Result Diagrams: 


 04/17/20 06:07





 04/17/20 06:07


Additional Lab and Data: 


 Lab Results











  04/15/20 04/15/20 04/15/20 Range/Units





  11:10 11:10 11:10 


 


WBC  19.4 H    (3.5-10.8)  10^3/uL


 


RBC  4.29    (4.18-5.48)  10^6 /uL


 


Hgb  13.2 L    (14.0-18.0)  g/dL


 


Hct  40 L    (42-52)  %


 


MCV  93    (80-94)  fL


 


MCH  31    (27-31)  pg


 


MCHC  33    (31-36)  g/dL


 


RDW  14    (10-15)  %


 


Plt Count  520 H    (150-450)  10^3/uL


 


MPV  7.8    (7.4-10.4)  fL


 


Neut % (Auto)  86.0    %


 


Lymph % (Auto)  5.9    %


 


Mono % (Auto)  6.0    %


 


Eos % (Auto)  1.7    %


 


Baso % (Auto)  0.4    %


 


Absolute Neuts (auto)  16.7 H    (1.5-7.7)  10^3/ul


 


Absolute Lymphs (auto)  1.1    (1.0-4.8)  10^3/ul


 


Absolute Monos (auto)  1.2 H    (0-0.8)  10^3/ul


 


Absolute Eos (auto)  0.3    (0-0.6)  10^3/ul


 


Absolute Basos (auto)  0.1    (0-0.2)  10^3/ul


 


Absolute Nucleated RBC  0.0    10^3/ul


 


Nucleated RBC %  0.0    


 


Sodium   131 L   (135-145)  mmol/L


 


Potassium   4.0   (3.5-5.0)  mmol/L


 


Chloride   99 L   (101-111)  mmol/L


 


Carbon Dioxide   23   (22-32)  mmol/L


 


Anion Gap   9   (2-11)  mmol/L


 


BUN   16   (6-24)  mg/dL


 


Creatinine   1.05   (0.67-1.17)  mg/dL


 


Est GFR ( Amer)   86.3   (>60)  


 


Est GFR (Non-Af Amer)   71.3   (>60)  


 


BUN/Creatinine Ratio   15.2   (8-20)  


 


Glucose   95   ()  mg/dL


 


Lactic Acid    1.1  (0.5-2.0)  mmol/L


 


Calcium   9.5   (8.6-10.3)  mg/dL


 


Total Bilirubin   0.80   (0.2-1.0)  mg/dL


 


AST   12 L   (13-39)  U/L


 


ALT   9   (7-52)  U/L


 


Alkaline Phosphatase   76   ()  U/L


 


Troponin I   0.11 H*   (<0.03)  ng/mL


 


C-Reactive Protein   155.06 H   (<8.01)  mg/L


 


Total Protein   7.8   (6.4-8.9)  g/dL


 


Albumin   3.8   (3.2-5.2)  g/dL


 


Globulin   4.0   (2-4)  g/dL


 


Albumin/Globulin Ratio   1.0   (1-3)  











Microbiology and Other Data: 


 Microbiology











 04/15/20 11:30 Gram Stain - Final





 Sputum Expectorated 


 


 04/15/20 13:02 Legionella Urinary Antigen - Final





 Urine    Negative Legionella Antigen





 Streptococcus pneumoniae Ag Screen - Final





    Negative S. pneumo Antigen














Assess/Plan/Problems-Billing


Assessment: 





Patient is a 66yo male with a PMH only for Hypothyroidism S/P thyroidectomy, 

here for his third admission in 2 months for shortness of breath currently 

being treated with high dose steroids and antibiotics for the possibility of 

ILD or treatment failure for pneumonia. 





- Patient Problems


(1) Acute respiratory failure with hypoxia


Status: Acute   Code(s): J96.01 - ACUTE RESPIRATORY FAILURE WITH HYPOXIA   

SNOMED Code(s): 29075693


   Comment: 


- Originally was on 10L via face mask. Weaned to 4 liters 


- Unclear cause, concern for ILD vs Pneumonia with treatment failure


- CT showing pneumonia vs pneumonitis 


- will continue doxycycline and cefepime, and steroids


- spoke to Dr. Younger - who will see the patient in consultation 





   





(2) Bronchopneumonia


Status: Acute   Code(s): J18.0 - BRONCHOPNEUMONIA, UNSPECIFIED ORGANISM   

SNOMED Code(s): 812349817


   Comment: 


- Questionable diagnosis given treatment failure on multiple antibiotics


- Flu negative, COVID previously negative, Repeat negative 


- On Cefepime and doxycycline for coverage of Pseudomonas(Previously on Levaquin

), MRSA and atypicals


- High dose steroids for concern for ILD


- CT chest - lung findings concerning for etiology for atypical pneumonia, 

pulmonary  edema, hypersensitivy pneumonitis, bronchiolitis obliterans


- spoke to Carisa Doshi who will see the patient in consultation 


   





(3) ETOH abuse


Status: Acute   Code(s): F10.10 - ALCOHOL ABUSE, UNCOMPLICATED   SNOMED Code(s)

: 06761798


   Comment: 


- No longer drinking due to breathing issues


- States alcohol makes breathing worse   





(4) Hypothyroid


Status: Acute   Code(s): E03.9 - HYPOTHYROIDISM, UNSPECIFIED   SNOMED Code(s): 

19149031


   Comment: 


- TSH on low end of normal at 0.38, continue 125mcg daily


- Follow up outpatient.    





(5) DVT prophylaxis


Status: Acute   Code(s): Z29.9 - ENCOUNTER FOR PROPHYLACTIC MEASURES, 

UNSPECIFIED   SNOMED Code(s): 971239440


   Comment: 


-Continue lovenox.   





(6) Full code status


Status: Acute   Code(s): Z78.9 - OTHER SPECIFIED HEALTH STATUS   SNOMED Code(s)

: 926022696


   


Status and Disposition: 





Inpatient for respiratory failure, will discharge home when medically stable.

## 2020-04-18 RX ADMIN — METHYLPREDNISOLONE SODIUM SUCCINATE SCH MG: 40 INJECTION, POWDER, FOR SOLUTION INTRAMUSCULAR; INTRAVENOUS at 17:35

## 2020-04-18 RX ADMIN — PANTOPRAZOLE SODIUM SCH MG: 40 TABLET, DELAYED RELEASE ORAL at 08:30

## 2020-04-18 RX ADMIN — METHYLPREDNISOLONE SODIUM SUCCINATE SCH MG: 125 INJECTION, POWDER, FOR SOLUTION INTRAMUSCULAR; INTRAVENOUS at 00:09

## 2020-04-18 RX ADMIN — CEFEPIME HYDROCHLORIDE SCH MLS/HR: 2 INJECTION, POWDER, FOR SOLUTION INTRAVENOUS at 00:11

## 2020-04-18 RX ADMIN — CEFEPIME HYDROCHLORIDE SCH MLS/HR: 2 INJECTION, POWDER, FOR SOLUTION INTRAVENOUS at 13:07

## 2020-04-18 RX ADMIN — CETIRIZINE HYDROCHLORIDE SCH MG: 10 TABLET, FILM COATED ORAL at 08:30

## 2020-04-18 RX ADMIN — DOXYCYCLINE SCH MLS/HR: 100 INJECTION, POWDER, LYOPHILIZED, FOR SOLUTION INTRAVENOUS at 15:29

## 2020-04-18 RX ADMIN — Medication SCH MG: at 08:30

## 2020-04-18 RX ADMIN — ENOXAPARIN SODIUM SCH MG: 40 INJECTION SUBCUTANEOUS at 15:34

## 2020-04-18 RX ADMIN — GUAIFENESIN SCH MG: 600 TABLET, EXTENDED RELEASE ORAL at 08:30

## 2020-04-18 RX ADMIN — DOXYCYCLINE SCH MLS/HR: 100 INJECTION, POWDER, LYOPHILIZED, FOR SOLUTION INTRAVENOUS at 03:55

## 2020-04-18 RX ADMIN — GUAIFENESIN SCH MG: 600 TABLET, EXTENDED RELEASE ORAL at 20:10

## 2020-04-18 RX ADMIN — LEVOTHYROXINE SODIUM SCH MCG: 125 TABLET ORAL at 05:50

## 2020-04-18 RX ADMIN — METHYLPREDNISOLONE SODIUM SUCCINATE SCH MG: 125 INJECTION, POWDER, FOR SOLUTION INTRAMUSCULAR; INTRAVENOUS at 08:31

## 2020-04-18 NOTE — PN
Subjective


Date of Service: 04/18/20


Interval History: 





Reports that he is feeling better today,  reports that breathing is improved.  

Reports that he continue to have worsening shortness of breath with exertion.  

Denies fever or chills.  Denies abd pain , n/v/d. 





Patient updated on plan, will decrease steroids and attempt to wean o2 today.





Family History: Unchanged from Admission


Social History: Unchanged from Admission


Past Medical History: Unchanged from Admission





Objective


Active Medications: 








Acetaminophen (Tylenol Tab*)  650 mg PO Q6H PRN


   PRN Reason: MILD PAIN or TEMP > 100.4


   Last Admin: 04/18/20 00:27 Dose:  650 mg


Albuterol (Ventolin Hfa Inhaler*)  2 puff INH Q4H PRN


   PRN Reason: SOB/WHEEZING


   Last Admin: 04/15/20 19:20 Dose:  2 puff


Benzonatate (Tessalon Cap*)  100 mg PO BID PRN


   PRN Reason: COUGH


   Last Admin: 04/15/20 20:50 Dose:  100 mg


Cetirizine HCl (Zyrtec*)  10 mg PO DAILY [facility]


   Last Admin: 04/18/20 08:30 Dose:  10 mg


Enoxaparin Sodium (Lovenox(*))  40 mg SUBCUT Q24H [facility]


   Last Admin: 04/17/20 14:47 Dose:  40 mg


Guaifenesin (Mucinex*)  1,200 mg PO BID [facility]


   Last Admin: 04/18/20 08:30 Dose:  1,200 mg


Cefepime HCl 2 gm/ Sodium (Chloride)  50 mls @ 100 mls/hr IVPB Q12H [facility]


   Last Admin: 04/18/20 00:11 Dose:  100 mls/hr


Doxycycline Hyclate 100 mg/ (Sodium Chloride)  250 mls @ 250 mls/hr IVPB Q12H 

[facility]


   Last Admin: 04/18/20 03:55 Dose:  250 mls/hr


Levothyroxine Sodium (Synthroid Tab*)  125 mcg PO DAILY@0600 [facility]


   Last Admin: 04/18/20 05:50 Dose:  125 mcg


Methylprednisolone Sodium Succinate (Solu-Medrol 125mg *)  40 mg IV Q12H [facility]


   Last Admin: 04/18/20 11:09 Dose:  Not Given


Ondansetron HCl (Zofran Inj*)  4 mg IV Q6H PRN


   PRN Reason: NAUSEA


Pantoprazole Sodium (Protonix Tab*)  40 mg PO DAILY [facility]


   Last Admin: 04/18/20 08:30 Dose:  40 mg


Thiamine HCl (Vitamin B-1 Tab*)  100 mg PO DAILY [facility]


   Last Admin: 04/18/20 08:30 Dose:  100 mg








 Vital Signs - 8 hr











  04/18/20 04/18/20





  07:15 08:00


 


Temperature 97.9 F 


 


Pulse Rate 60 


 


Respiratory 20 20





Rate  


 


Blood Pressure 112/58 





(mmHg)  


 


O2 Sat by Pulse 96 





Oximetry  











Oxygen Devices in Use Now: Nasal Cannula


Appearance: alert, sitting in the chair, no acute distress


Eyes: No Scleral Icterus


Ears/Nose/Mouth/Throat: Clear Oropharnyx, Mucous Membranes Moist


Neck: NL Appearance and Movements; NL JVP, Trachea Midline


Respiratory: Symmetrical Chest Expansion and Respiratory Effort, -


Cardiovascular: NL Sounds; No Murmurs; No JVD, No Edema


Abdominal: NL Sounds; No Tenderness; No Distention


Lymphatic: No Cervical Adenopathy


Extremities: No Edema, No Clubbing, Cyanosis


Skin: No Rash or Ulcers


Neurological: Alert and Oriented x 3


Nutrition: Taking PO's


Result Diagrams: 


 04/17/20 06:07





 04/17/20 06:07


Additional Lab and Data: 


 Lab Results











  04/15/20 04/15/20 04/15/20 Range/Units





  11:10 11:10 11:10 


 


WBC  19.4 H    (3.5-10.8)  10^3/uL


 


RBC  4.29    (4.18-5.48)  10^6 /uL


 


Hgb  13.2 L    (14.0-18.0)  g/dL


 


Hct  40 L    (42-52)  %


 


MCV  93    (80-94)  fL


 


MCH  31    (27-31)  pg


 


MCHC  33    (31-36)  g/dL


 


RDW  14    (10-15)  %


 


Plt Count  520 H    (150-450)  10^3/uL


 


MPV  7.8    (7.4-10.4)  fL


 


Neut % (Auto)  86.0    %


 


Lymph % (Auto)  5.9    %


 


Mono % (Auto)  6.0    %


 


Eos % (Auto)  1.7    %


 


Baso % (Auto)  0.4    %


 


Absolute Neuts (auto)  16.7 H    (1.5-7.7)  10^3/ul


 


Absolute Lymphs (auto)  1.1    (1.0-4.8)  10^3/ul


 


Absolute Monos (auto)  1.2 H    (0-0.8)  10^3/ul


 


Absolute Eos (auto)  0.3    (0-0.6)  10^3/ul


 


Absolute Basos (auto)  0.1    (0-0.2)  10^3/ul


 


Absolute Nucleated RBC  0.0    10^3/ul


 


Nucleated RBC %  0.0    


 


Sodium   131 L   (135-145)  mmol/L


 


Potassium   4.0   (3.5-5.0)  mmol/L


 


Chloride   99 L   (101-111)  mmol/L


 


Carbon Dioxide   23   (22-32)  mmol/L


 


Anion Gap   9   (2-11)  mmol/L


 


BUN   16   (6-24)  mg/dL


 


Creatinine   1.05   (0.67-1.17)  mg/dL


 


Est GFR ( Amer)   86.3   (>60)  


 


Est GFR (Non-Af Amer)   71.3   (>60)  


 


BUN/Creatinine Ratio   15.2   (8-20)  


 


Glucose   95   ()  mg/dL


 


Lactic Acid    1.1  (0.5-2.0)  mmol/L


 


Calcium   9.5   (8.6-10.3)  mg/dL


 


Total Bilirubin   0.80   (0.2-1.0)  mg/dL


 


AST   12 L   (13-39)  U/L


 


ALT   9   (7-52)  U/L


 


Alkaline Phosphatase   76   ()  U/L


 


Troponin I   0.11 H*   (<0.03)  ng/mL


 


C-Reactive Protein   155.06 H   (<8.01)  mg/L


 


Total Protein   7.8   (6.4-8.9)  g/dL


 


Albumin   3.8   (3.2-5.2)  g/dL


 


Globulin   4.0   (2-4)  g/dL


 


Albumin/Globulin Ratio   1.0   (1-3)  











Microbiology and Other Data: 


 Microbiology











 04/15/20 11:30 Gram Stain - Final





 Sputum Expectorated 


 


 04/15/20 13:02 Legionella Urinary Antigen - Final





 Urine    Negative Legionella Antigen





 Streptococcus pneumoniae Ag Screen - Final





    Negative S. pneumo Antigen














Assess/Plan/Problems-Billing


Assessment: 





Patient is a 66yo male with a PMH only for Hypothyroidism S/P thyroidectomy, 

here for his third admission in 2 months for shortness of breath currently 

being treated with high dose steroids and antibiotics for the possibility of 

ILD or treatment failure for pneumonia. 





- Patient Problems


(1) Acute respiratory failure with hypoxia


Current Visit: No   Status: Acute   Code(s): J96.01 - ACUTE RESPIRATORY FAILURE 

WITH HYPOXIA   SNOMED Code(s): 17943788


   Comment: 


- Originally was on 10L via face mask, will continue to wean o2 today


- Unclear cause, concern for ILD vs Pneumonia with treatment failure


- CT showing pneumonia vs pneumonitis - question if environmental exposure is 

playing a role in recurrent lung conditions


- will continue doxycycline and cefepime, and steroids ( decreased today)


- spoke to Dr. Younger - who will see the patient in consultation tomorrow 





   





(2) Bronchopneumonia


Current Visit: No   Status: Acute   Code(s): J18.0 - BRONCHOPNEUMONIA, 

UNSPECIFIED ORGANISM   SNOMED Code(s): 625436388


   Comment: 


- Questionable diagnosis given treatment failure on multiple antibiotics


- Flu negative, COVID previously negative, Repeat negative 


- On Cefepime and doxycycline for coverage of Pseudomonas(Previously on Levaquin

), MRSA and atypicals


- High dose steroids for concern for ILD- will decrease steroids today as 

patient has improved significantly. 


- CT chest - lung findings concerning for etiology for atypical pneumonia, 

pulmonary  edema, hypersensitivy pneumonitis, bronchiolitis obliterans


- spoke to Carsia Doshi who will see the patient in consultation 


   





(3) ETOH abuse


Current Visit: No   Status: Acute   Code(s): F10.10 - ALCOHOL ABUSE, 

UNCOMPLICATED   SNOMED Code(s): 51490754


   Comment: 


- No longer drinking due to breathing issues


- States alcohol makes breathing worse   





(4) Hypothyroid


Current Visit: No   Status: Acute   Code(s): E03.9 - HYPOTHYROIDISM, 

UNSPECIFIED   SNOMED Code(s): 91162615


   Comment: 


- TSH on low end of normal at 0.38, continue 125mcg daily


- Follow up outpatient.    





(5) DVT prophylaxis


Current Visit: No   Status: Acute   Code(s): Z29.9 - ENCOUNTER FOR PROPHYLACTIC 

MEASURES, UNSPECIFIED   SNOMED Code(s): 098273567


   Comment: 


-Continue lovenox.   





(6) Full code status


Current Visit: No   Status: Acute   Code(s): Z78.9 - OTHER SPECIFIED HEALTH 

STATUS   SNOMED Code(s): 513435961


   


Status and Disposition: 





Inpatient for respiratory failure, will discharge home when medically stable.

## 2020-04-19 LAB
ANION GAP SERPL CALC-SCNC: 6 MMOL/L (ref 2–11)
BASOPHILS # BLD AUTO: 0 10^3/UL (ref 0–0.2)
BUN SERPL-MCNC: 23 MG/DL (ref 6–24)
BUN/CREAT SERPL: 30.7 (ref 8–20)
CALCIUM SERPL-MCNC: 8.7 MG/DL (ref 8.6–10.3)
CHLORIDE SERPL-SCNC: 105 MMOL/L (ref 101–111)
EOSINOPHIL # BLD AUTO: 0 10^3/UL (ref 0–0.6)
GLUCOSE SERPL-MCNC: 108 MG/DL (ref 70–100)
HCO3 SERPL-SCNC: 25 MMOL/L (ref 22–32)
HCT VFR BLD AUTO: 36 % (ref 42–52)
HGB BLD-MCNC: 12.5 G/DL (ref 14–18)
LYMPHOCYTES # BLD AUTO: 0.9 10^3/UL (ref 1–4.8)
MCH RBC QN AUTO: 32 PG (ref 27–31)
MCHC RBC AUTO-ENTMCNC: 35 G/DL (ref 31–36)
MCV RBC AUTO: 92 FL (ref 80–94)
MONOCYTES # BLD AUTO: 1.2 10^3/UL (ref 0–0.8)
NEUTROPHILS # BLD AUTO: 9.8 10^3/UL (ref 1.5–7.7)
NRBC # BLD AUTO: 0 10^3/UL
NRBC BLD QL AUTO: 0
PLATELET # BLD AUTO: 478 10^3/UL (ref 150–450)
POTASSIUM SERPL-SCNC: 4.5 MMOL/L (ref 3.5–5)
RBC # BLD AUTO: 3.95 10^6 /UL (ref 4.18–5.48)
SODIUM SERPL-SCNC: 136 MMOL/L (ref 135–145)
WBC # BLD AUTO: 12 10^3/UL (ref 3.5–10.8)

## 2020-04-19 RX ADMIN — PANTOPRAZOLE SODIUM SCH MG: 40 TABLET, DELAYED RELEASE ORAL at 08:33

## 2020-04-19 RX ADMIN — METHYLPREDNISOLONE SODIUM SUCCINATE SCH MG: 40 INJECTION, POWDER, FOR SOLUTION INTRAMUSCULAR; INTRAVENOUS at 16:54

## 2020-04-19 RX ADMIN — METHYLPREDNISOLONE SODIUM SUCCINATE SCH MG: 40 INJECTION, POWDER, FOR SOLUTION INTRAMUSCULAR; INTRAVENOUS at 04:31

## 2020-04-19 RX ADMIN — Medication SCH MG: at 08:33

## 2020-04-19 RX ADMIN — DOXYCYCLINE SCH MLS/HR: 100 INJECTION, POWDER, LYOPHILIZED, FOR SOLUTION INTRAVENOUS at 02:54

## 2020-04-19 RX ADMIN — CEFEPIME HYDROCHLORIDE SCH MLS/HR: 2 INJECTION, POWDER, FOR SOLUTION INTRAVENOUS at 00:58

## 2020-04-19 RX ADMIN — ENOXAPARIN SODIUM SCH MG: 40 INJECTION SUBCUTANEOUS at 15:38

## 2020-04-19 RX ADMIN — DOXYCYCLINE SCH MLS/HR: 100 INJECTION, POWDER, LYOPHILIZED, FOR SOLUTION INTRAVENOUS at 15:35

## 2020-04-19 RX ADMIN — GUAIFENESIN SCH MG: 600 TABLET, EXTENDED RELEASE ORAL at 08:33

## 2020-04-19 RX ADMIN — CEFEPIME HYDROCHLORIDE SCH MLS/HR: 2 INJECTION, POWDER, FOR SOLUTION INTRAVENOUS at 12:21

## 2020-04-19 RX ADMIN — CETIRIZINE HYDROCHLORIDE SCH MG: 10 TABLET, FILM COATED ORAL at 08:33

## 2020-04-19 RX ADMIN — GUAIFENESIN SCH MG: 600 TABLET, EXTENDED RELEASE ORAL at 20:15

## 2020-04-19 RX ADMIN — LEVOTHYROXINE SODIUM SCH MCG: 125 TABLET ORAL at 04:31
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
## 2020-04-19 NOTE — PN
Subjective


Date of Service: 04/19/20


Interval History: 





Patient states that his cough is greatly improved. He no longer feels dyspneic 

at rest, and did not feel dyspneic while walking around the unit today. Denies 

chest pain, fever/chills, abd pain, n/v/d.


Family History: Unchanged from Admission


Social History: Unchanged from Admission


Past Medical History: Unchanged from Admission





Objective


Active Medications: 








Acetaminophen (Tylenol Tab*)  650 mg PO Q6H PRN


   PRN Reason: MILD PAIN or TEMP > 100.4


   Last Admin: 04/18/20 00:27 Dose:  650 mg


Albuterol (Ventolin Hfa Inhaler*)  2 puff INH Q4H PRN


   PRN Reason: SOB/WHEEZING


   Last Admin: 04/15/20 19:20 Dose:  2 puff


Benzonatate (Tessalon Cap*)  100 mg PO BID PRN


   PRN Reason: COUGH


   Last Admin: 04/15/20 20:50 Dose:  100 mg


Cetirizine HCl (Zyrtec*)  10 mg PO DAILY [facility]


   Last Admin: 04/19/20 08:33 Dose:  10 mg


Enoxaparin Sodium (Lovenox(*))  40 mg SUBCUT Q24H [facility]


   Last Admin: 04/19/20 15:38 Dose:  40 mg


Guaifenesin (Mucinex*)  1,200 mg PO BID [facility]


   Last Admin: 04/19/20 08:33 Dose:  1,200 mg


Cefepime HCl 2 gm/ Sodium (Chloride)  50 mls @ 100 mls/hr IVPB Q12H [facility]


   Last Admin: 04/19/20 12:21 Dose:  100 mls/hr


Doxycycline Hyclate 100 mg/ (Sodium Chloride)  250 mls @ 250 mls/hr IVPB Q12H 

[facility]


   Last Admin: 04/19/20 15:35 Dose:  250 mls/hr


Levothyroxine Sodium (Synthroid Tab*)  125 mcg PO DAILY@0600 [facility]


   Last Admin: 04/19/20 04:31 Dose:  125 mcg


Melatonin (Melatonin)  3 mg PO BEDTIME PRN


   PRN Reason: SLEEP


   Last Admin: 04/18/20 20:10 Dose:  3 mg


Methylprednisolone Sodium Succinate (Solu-Medrol 40 Mg)  40 mg IV Q12H [facility]


   Last Admin: 04/19/20 16:54 Dose:  40 mg


Ondansetron HCl (Zofran Inj*)  4 mg IV Q6H PRN


   PRN Reason: NAUSEA


Pantoprazole Sodium (Protonix Tab*)  40 mg PO DAILY [facility]


   Last Admin: 04/19/20 08:33 Dose:  40 mg


Thiamine HCl (Vitamin B-1 Tab*)  100 mg PO DAILY [facility]


   Last Admin: 04/19/20 08:33 Dose:  100 mg








 Vital Signs - 8 hr











  04/19/20





  11:15


 


Temperature 98.1 F


 


Pulse Rate 75


 


Respiratory 20





Rate 


 


Blood Pressure 112/60





(mmHg) 


 


O2 Sat by Pulse 93





Oximetry 











Oxygen Devices in Use Now: Nasal Cannula


Appearance: Thin, white male, sitting upright in bed, appearing comfortable and 

in NAD


Eyes: No Scleral Icterus, - - PERRL


Ears/Nose/Mouth/Throat: Mucous Membranes Moist


Neck: NL Appearance and Movements; NL JVP, Trachea Midline


Respiratory: Symmetrical Chest Expansion and Respiratory Effort, Clear to 

Auscultation


Cardiovascular: NL Sounds; No Murmurs; No JVD, RRR


Abdominal: - - abd soft, nontender, nondistended


Extremities: No Edema, No Clubbing, Cyanosis


Skin: No Rash or Ulcers


Neurological: Alert and Oriented x 3


Result Diagrams: 


 04/19/20 06:49





 04/19/20 06:49


Additional Lab and Data: 


 Lab Results











  04/15/20 04/15/20 04/15/20 Range/Units





  11:10 11:10 11:10 


 


WBC  19.4 H    (3.5-10.8)  10^3/uL


 


RBC  4.29    (4.18-5.48)  10^6 /uL


 


Hgb  13.2 L    (14.0-18.0)  g/dL


 


Hct  40 L    (42-52)  %


 


MCV  93    (80-94)  fL


 


MCH  31    (27-31)  pg


 


MCHC  33    (31-36)  g/dL


 


RDW  14    (10-15)  %


 


Plt Count  520 H    (150-450)  10^3/uL


 


MPV  7.8    (7.4-10.4)  fL


 


Neut % (Auto)  86.0    %


 


Lymph % (Auto)  5.9    %


 


Mono % (Auto)  6.0    %


 


Eos % (Auto)  1.7    %


 


Baso % (Auto)  0.4    %


 


Absolute Neuts (auto)  16.7 H    (1.5-7.7)  10^3/ul


 


Absolute Lymphs (auto)  1.1    (1.0-4.8)  10^3/ul


 


Absolute Monos (auto)  1.2 H    (0-0.8)  10^3/ul


 


Absolute Eos (auto)  0.3    (0-0.6)  10^3/ul


 


Absolute Basos (auto)  0.1    (0-0.2)  10^3/ul


 


Absolute Nucleated RBC  0.0    10^3/ul


 


Nucleated RBC %  0.0    


 


Sodium   131 L   (135-145)  mmol/L


 


Potassium   4.0   (3.5-5.0)  mmol/L


 


Chloride   99 L   (101-111)  mmol/L


 


Carbon Dioxide   23   (22-32)  mmol/L


 


Anion Gap   9   (2-11)  mmol/L


 


BUN   16   (6-24)  mg/dL


 


Creatinine   1.05   (0.67-1.17)  mg/dL


 


Est GFR ( Amer)   86.3   (>60)  


 


Est GFR (Non-Af Amer)   71.3   (>60)  


 


BUN/Creatinine Ratio   15.2   (8-20)  


 


Glucose   95   ()  mg/dL


 


Lactic Acid    1.1  (0.5-2.0)  mmol/L


 


Calcium   9.5   (8.6-10.3)  mg/dL


 


Total Bilirubin   0.80   (0.2-1.0)  mg/dL


 


AST   12 L   (13-39)  U/L


 


ALT   9   (7-52)  U/L


 


Alkaline Phosphatase   76   ()  U/L


 


Troponin I   0.11 H*   (<0.03)  ng/mL


 


C-Reactive Protein   155.06 H   (<8.01)  mg/L


 


Total Protein   7.8   (6.4-8.9)  g/dL


 


Albumin   3.8   (3.2-5.2)  g/dL


 


Globulin   4.0   (2-4)  g/dL


 


Albumin/Globulin Ratio   1.0   (1-3)  











Microbiology and Other Data: 


 Microbiology











 04/15/20 11:30 Gram Stain - Final





 Sputum Expectorated 


 


 04/15/20 13:02 Legionella Urinary Antigen - Final





 Urine    Negative Legionella Antigen





 Streptococcus pneumoniae Ag Screen - Final





    Negative S. pneumo Antigen














Assess/Plan/Problems-Billing


Assessment: 





Patient is a 64yo male with a PMH only for Hypothyroidism S/P thyroidectomy, 

here for his third admission in 2 months for shortness of breath currently 

being treated with high dose steroids and antibiotics for the possibility of 

ILD or treatment failure for pneumonia. 





- Patient Problems


(1) Acute respiratory failure with hypoxia


Current Visit: No   Status: Acute   Code(s): J96.01 - ACUTE RESPIRATORY FAILURE 

WITH HYPOXIA   SNOMED Code(s): 06746076


   Comment: 


- Originally was on 10L via face mask


- On 2L oxygen today and unable to be weaned further thus far


- Unclear cause, concern for ILD vs Pneumonia with treatment failure


- CT showing pneumonia vs pneumonitis - question if environmental exposure is 

playing a role in recurrent lung conditions


- will continue doxycycline, cefepime, and steroids





   





(2) Bronchopneumonia


Current Visit: No   Status: Acute   Code(s): J18.0 - BRONCHOPNEUMONIA, 

UNSPECIFIED ORGANISM   SNOMED Code(s): 460313615


   Comment: 


- Questionable diagnosis given treatment failure on multiple antibiotics


- Flu negative


- COVID previously negative, Repeat negative 


- On Cefepime and doxycycline for coverage of Pseudomonas (Previously on 

Levaquin), MRSA, and atypicals


- High dose steroids for concern for ILD; holding off on changing today while 

awaiting Dr. Younger's input


- CT chest findings concerning for etiology for atypical pneumonia, pulmonary 

edema, hypersensitivy pneumonitis, bronchiolitis obliterans


- appreciate Dr. Younger's consult


- fungal antibodies negative


- patient has been afebrile during hospitalization. Presented with leukocytosis


   





(3) Hypothyroid


Current Visit: No   Status: Acute   Code(s): E03.9 - HYPOTHYROIDISM, 

UNSPECIFIED   SNOMED Code(s): 84795000


   Comment: 


- TSH on low end of normal at 0.38, continue 125mcg daily


- Follow up outpatient.    





(4) DVT prophylaxis


Current Visit: No   Status: Acute   Code(s): Z29.9 - ENCOUNTER FOR PROPHYLACTIC 

MEASURES, UNSPECIFIED   SNOMED Code(s): 591853836


   Comment: 


-Continue lovenox.   





(5) Full code status


Current Visit: No   Status: Acute   Code(s): Z78.9 - OTHER SPECIFIED HEALTH 

STATUS   SNOMED Code(s): 851937520


   


Status and Disposition: 





Inpatient for respiratory failure, will discharge home when medically stable.

## 2020-04-20 RX ADMIN — CETIRIZINE HYDROCHLORIDE SCH MG: 10 TABLET, FILM COATED ORAL at 07:47

## 2020-04-20 RX ADMIN — LEVOTHYROXINE SODIUM SCH MCG: 125 TABLET ORAL at 05:46

## 2020-04-20 RX ADMIN — GUAIFENESIN SCH MG: 600 TABLET, EXTENDED RELEASE ORAL at 20:23

## 2020-04-20 RX ADMIN — GUAIFENESIN SCH MG: 600 TABLET, EXTENDED RELEASE ORAL at 07:47

## 2020-04-20 RX ADMIN — METHYLPREDNISOLONE SODIUM SUCCINATE SCH MG: 40 INJECTION, POWDER, FOR SOLUTION INTRAMUSCULAR; INTRAVENOUS at 17:20

## 2020-04-20 RX ADMIN — CEFEPIME HYDROCHLORIDE SCH MLS/HR: 2 INJECTION, POWDER, FOR SOLUTION INTRAVENOUS at 11:50

## 2020-04-20 RX ADMIN — ENOXAPARIN SODIUM SCH MG: 40 INJECTION SUBCUTANEOUS at 15:09

## 2020-04-20 RX ADMIN — Medication SCH MG: at 07:47

## 2020-04-20 RX ADMIN — DOXYCYCLINE SCH MLS/HR: 100 INJECTION, POWDER, LYOPHILIZED, FOR SOLUTION INTRAVENOUS at 02:19

## 2020-04-20 RX ADMIN — CEFEPIME HYDROCHLORIDE SCH MLS/HR: 2 INJECTION, POWDER, FOR SOLUTION INTRAVENOUS at 00:24

## 2020-04-20 RX ADMIN — METHYLPREDNISOLONE SODIUM SUCCINATE SCH MG: 40 INJECTION, POWDER, FOR SOLUTION INTRAMUSCULAR; INTRAVENOUS at 05:46

## 2020-04-20 RX ADMIN — DOXYCYCLINE SCH MLS/HR: 100 INJECTION, POWDER, LYOPHILIZED, FOR SOLUTION INTRAVENOUS at 15:08

## 2020-04-20 RX ADMIN — PANTOPRAZOLE SODIUM SCH MG: 40 TABLET, DELAYED RELEASE ORAL at 07:47

## 2020-04-20 NOTE — PN
Progress Note





- Progress Note


Date of Service: 04/20/20 - Pulm f/u note


Note: 





Pt seen and examined at bedside. Pt reports feeling better. SAINI is improved, 

cough is intermittent. Snoring at night, GERD+





 Active Medications











Generic Name Dose Route Start Last Admin





  Trade Name Freq  PRN Reason Stop Dose Admin


 


Acetaminophen  650 mg  04/15/20 14:32  04/18/20 00:27





  Tylenol Tab*  PO   650 mg





  Q6H PRN   Administration





  MILD PAIN or TEMP > 100.4   





     





     





     


 


Albuterol  2 puff  04/15/20 14:36  04/15/20 19:20





  Ventolin Hfa Inhaler*  INH   2 puff





  Q4H PRN   Administration





  SOB/WHEEZING   





     





     





     


 


Benzonatate  100 mg  04/15/20 14:32  04/15/20 20:50





  Tessalon Cap*  PO   100 mg





  BID PRN   Administration





  COUGH   





     





     





     


 


Cetirizine HCl  10 mg  04/16/20 09:00  04/20/20 07:47





  Zyrtec*  PO   10 mg





  DAILY CANDIDO   Administration





     





     





     





     


 


Enoxaparin Sodium  40 mg  04/15/20 15:00  04/19/20 15:38





  Lovenox(*)  SUBCUT   40 mg





  Q24H CANDIDO   Administration





     





     





     





     


 


Guaifenesin  1,200 mg  04/15/20 21:00  04/20/20 07:47





  Mucinex*  PO   1,200 mg





  BID CANDIDO   Administration





     





     





     





     


 


Cefepime HCl 2 gm/ Sodium  50 mls @ 100 mls/hr  04/16/20 00:00  04/20/20 11:50





  Chloride  IVPB   100 mls/hr





  Q12H CANDIDO   Administration





     





     





     





     


 


Doxycycline Hyclate 100 mg/  250 mls @ 250 mls/hr  04/15/20 15:00  04/20/20 02:

19





  Sodium Chloride  IVPB   250 mls/hr





  Q12H CANDIDO   Administration





     





     





     





     


 


Levothyroxine Sodium  125 mcg  04/16/20 06:00  04/20/20 05:46





  Synthroid Tab*  PO   125 mcg





  DAILY@0600 CANDIDO   Administration





     





     





     





     


 


Melatonin  3 mg  04/18/20 12:48  04/19/20 20:15





  Melatonin  PO   3 mg





  BEDTIME PRN   Administration





  SLEEP   





     





     





     


 


Methylprednisolone Sodium Succinate  40 mg  04/18/20 17:00  04/20/20 05:46





  Solu-Medrol 40 Mg  IV   40 mg





  Q12H CANDIDO   Administration





     





     





     





     


 


Ondansetron HCl  4 mg  04/15/20 14:32  





  Zofran Inj*  IV   





  Q6H PRN   





  NAUSEA   





     





     





     


 


Pantoprazole Sodium  40 mg  04/16/20 09:00  04/20/20 07:47





  Protonix Tab*  PO   40 mg





  DAILY CANDIDO   Administration





     





     





     





     


 


Thiamine HCl  100 mg  04/16/20 09:00  04/20/20 07:47





  Vitamin B-1 Tab*  PO   100 mg





  DAILY CANDIDO   Administration





     





     





     





     








 Vital Signs











Temp Pulse Resp BP Pulse Ox


 


 98 F   93   18   102/76   94 


 


 04/20/20 10:50  04/20/20 10:50  04/20/20 10:50  04/20/20 10:50  04/20/20 10:50








O/E: Pt in NAD


HEENT: PERRLA


Lungs: Good a/e b/l, no wheeze


CVS: S1, S2+


Abd: Soft, BS+


Ext: Normal ROM


Skin: NO rash


Neuro: NO focal deficits





Labs: NO new labs





I/R; 65 y o m with recurrent admissions recently for cough, SOB, being treated 

for PNA


Symptoms consistent with acute HSN pneumonitis


Pt with improvement in sx with prednisone


To assess O2 requirements prior to d/c


Will recommend slow prednisone taper at 10mg weekly, starting at 60mg


Will need PCP px until he is down to prednisone 20mg


Pathophysiology of HSN pneumonitis was discussed


Will need sleep study as out pt

## 2020-04-20 NOTE — PN
Subjective


Date of Service: 04/20/20


Interval History: 





Patient feeling well today. Has a very occasional dry cough. Patient ambulated 

around the floor with nursing staff and did not experience dyspnea. Denie 

dyspnea at rest, chest pain, fever/chills, abd pain.


Family History: Unchanged from Admission


Social History: Unchanged from Admission


Past Medical History: Unchanged from Admission





Objective


Active Medications: 








Acetaminophen (Tylenol Tab*)  650 mg PO Q6H PRN


   PRN Reason: MILD PAIN or TEMP > 100.4


   Last Admin: 04/18/20 00:27 Dose:  650 mg


Albuterol (Ventolin Hfa Inhaler*)  2 puff INH Q4H PRN


   PRN Reason: SOB/WHEEZING


   Last Admin: 04/15/20 19:20 Dose:  2 puff


Benzonatate (Tessalon Cap*)  100 mg PO BID PRN


   PRN Reason: COUGH


   Last Admin: 04/15/20 20:50 Dose:  100 mg


Cetirizine HCl (Zyrtec*)  10 mg PO DAILY [facility]


   Last Admin: 04/20/20 07:47 Dose:  10 mg


Enoxaparin Sodium (Lovenox(*))  40 mg SUBCUT Q24H [facility]


   Last Admin: 04/19/20 15:38 Dose:  40 mg


Guaifenesin (Mucinex*)  1,200 mg PO BID [facility]


   Last Admin: 04/20/20 07:47 Dose:  1,200 mg


Cefepime HCl 2 gm/ Sodium (Chloride)  50 mls @ 100 mls/hr IVPB Q12H [facility]


   Last Admin: 04/20/20 00:24 Dose:  100 mls/hr


Doxycycline Hyclate 100 mg/ (Sodium Chloride)  250 mls @ 250 mls/hr IVPB Q12H 

[facility]


   Last Admin: 04/20/20 02:19 Dose:  250 mls/hr


Levothyroxine Sodium (Synthroid Tab*)  125 mcg PO DAILY@0600 [facility]


   Last Admin: 04/20/20 05:46 Dose:  125 mcg


Melatonin (Melatonin)  3 mg PO BEDTIME PRN


   PRN Reason: SLEEP


   Last Admin: 04/19/20 20:15 Dose:  3 mg


Methylprednisolone Sodium Succinate (Solu-Medrol 40 Mg)  40 mg IV Q12H [facility]


   Last Admin: 04/20/20 05:46 Dose:  40 mg


Ondansetron HCl (Zofran Inj*)  4 mg IV Q6H PRN


   PRN Reason: NAUSEA


Pantoprazole Sodium (Protonix Tab*)  40 mg PO DAILY [facility]


   Last Admin: 04/20/20 07:47 Dose:  40 mg


Thiamine HCl (Vitamin B-1 Tab*)  100 mg PO DAILY [facility]


   Last Admin: 04/20/20 07:47 Dose:  100 mg








 Vital Signs - 8 hr











  04/20/20 04/20/20 04/20/20





  03:51 07:24 07:45


 


Temperature 97.4 F 97.9 F 


 


Pulse Rate 60 66 


 


Respiratory 18 20 





Rate   


 


Blood Pressure 122/68 127/75 





(mmHg)   


 


O2 Sat by Pulse 93 94 93





Oximetry   














  04/20/20 04/20/20





  10:04 10:05


 


Temperature  


 


Pulse Rate  


 


Respiratory  





Rate  


 


Blood Pressure  





(mmHg)  


 


O2 Sat by Pulse 98 93





Oximetry  











Oxygen Devices in Use Now: Nasal Cannula


Appearance: Thin, white male who appears older than stated age, sitting in chair

, appearing comfortable and in NAD


Eyes: No Scleral Icterus, - - PERRL


Ears/Nose/Mouth/Throat: Mucous Membranes Moist


Neck: NL Appearance and Movements; NL JVP, Trachea Midline


Respiratory: Symmetrical Chest Expansion and Respiratory Effort, Clear to 

Auscultation


Cardiovascular: NL Sounds; No Murmurs; No JVD, RRR


Abdominal: - - abdomen soft/nontender/nondistended


Extremities: No Edema, No Clubbing, Cyanosis


Skin: No Rash or Ulcers


Neurological: Alert and Oriented x 3


Result Diagrams: 


 04/19/20 06:49





 04/19/20 06:49


Additional Lab and Data: 


 Lab Results











  04/15/20 04/15/20 04/15/20 Range/Units





  11:10 11:10 11:10 


 


WBC  19.4 H    (3.5-10.8)  10^3/uL


 


RBC  4.29    (4.18-5.48)  10^6 /uL


 


Hgb  13.2 L    (14.0-18.0)  g/dL


 


Hct  40 L    (42-52)  %


 


MCV  93    (80-94)  fL


 


MCH  31    (27-31)  pg


 


MCHC  33    (31-36)  g/dL


 


RDW  14    (10-15)  %


 


Plt Count  520 H    (150-450)  10^3/uL


 


MPV  7.8    (7.4-10.4)  fL


 


Neut % (Auto)  86.0    %


 


Lymph % (Auto)  5.9    %


 


Mono % (Auto)  6.0    %


 


Eos % (Auto)  1.7    %


 


Baso % (Auto)  0.4    %


 


Absolute Neuts (auto)  16.7 H    (1.5-7.7)  10^3/ul


 


Absolute Lymphs (auto)  1.1    (1.0-4.8)  10^3/ul


 


Absolute Monos (auto)  1.2 H    (0-0.8)  10^3/ul


 


Absolute Eos (auto)  0.3    (0-0.6)  10^3/ul


 


Absolute Basos (auto)  0.1    (0-0.2)  10^3/ul


 


Absolute Nucleated RBC  0.0    10^3/ul


 


Nucleated RBC %  0.0    


 


Sodium   131 L   (135-145)  mmol/L


 


Potassium   4.0   (3.5-5.0)  mmol/L


 


Chloride   99 L   (101-111)  mmol/L


 


Carbon Dioxide   23   (22-32)  mmol/L


 


Anion Gap   9   (2-11)  mmol/L


 


BUN   16   (6-24)  mg/dL


 


Creatinine   1.05   (0.67-1.17)  mg/dL


 


Est GFR ( Amer)   86.3   (>60)  


 


Est GFR (Non-Af Amer)   71.3   (>60)  


 


BUN/Creatinine Ratio   15.2   (8-20)  


 


Glucose   95   ()  mg/dL


 


Lactic Acid    1.1  (0.5-2.0)  mmol/L


 


Calcium   9.5   (8.6-10.3)  mg/dL


 


Total Bilirubin   0.80   (0.2-1.0)  mg/dL


 


AST   12 L   (13-39)  U/L


 


ALT   9   (7-52)  U/L


 


Alkaline Phosphatase   76   ()  U/L


 


Troponin I   0.11 H*   (<0.03)  ng/mL


 


C-Reactive Protein   155.06 H   (<8.01)  mg/L


 


Total Protein   7.8   (6.4-8.9)  g/dL


 


Albumin   3.8   (3.2-5.2)  g/dL


 


Globulin   4.0   (2-4)  g/dL


 


Albumin/Globulin Ratio   1.0   (1-3)  











Microbiology and Other Data: 


 Microbiology











 04/15/20 11:30 Gram Stain - Final





 Sputum Expectorated 


 


 04/15/20 13:02 Legionella Urinary Antigen - Final





 Urine    Negative Legionella Antigen





 Streptococcus pneumoniae Ag Screen - Final





    Negative S. pneumo Antigen











Diagnostic Imaging: 





CT CHEST 4/16/20


FINDINGS: 


Thyroid: No thyroid nodules. 


Lungs: Interval worsening of patchy ground-glass opacification with a upper 


lobe and perihilar predominance. Mild dependent bibasilar atelectasis. No 


interlobular septal thickening, nodules, honeycombing, consolidation, or 


masses. No bronchiectasis, peribronchial thickening, or luminal defects. 


Pleural space: Biapical pleural thickening. No pleural effusion or 


pneumothorax. 


Heart: Normal. No cardiomegaly. No pericardial effusion. 


Aorta: The aorta demonstrates mild atherosclerotic calcification. 


Lymph nodes: Normal. No enlarged lymph nodes. 


Bones/joints: The thoracic spine demonstrates mild degenerative changes at 


multiple levels. No acute fractures. No suspicious bone lesions. 


Soft tissues: Normal.  





IMPRESSION: 


Lung findings with etiologies including atypical pneumonia, pulmonary edema, 


hypersensitivity pneumonitis, and bronchiolitis obliterans. 











Assess/Plan/Problems-Billing


Assessment: 





Patient is a 64yo male with a PMH only for Hypothyroidism S/P thyroidectomy, 

here for his third admission in 2 months for shortness of breath currently 

being treated with high dose steroids and antibiotics for the possibility of 

ILD or treatment failure for pneumonia. 





- Patient Problems


(1) Acute respiratory failure with hypoxia


Current Visit: No   Status: Acute   Code(s): J96.01 - ACUTE RESPIRATORY FAILURE 

WITH HYPOXIA   SNOMED Code(s): 61052644


   Comment: 


- Originally was on 10L via face mask


- CT chest findings as above, ground glass opacities of concern


- Dr. Younger saw patient in consultation and believes consistent with 

hypersensitivity pneumonitis


- 93% O2 sat on room air, drops to 91% with ambulation on room air


- I would prefer improved oxygen sat prior to d/c, will continue IV steroids 

and re-evaluate tomorrow


- will continue course of abx to cover for clinical presentation consistent 

with possible pneumonia, but seems less likely; today is day 6 and tomorrow 

will be completion of the course of doxy and cefepime





   





(2) Hypothyroid


Current Visit: No   Status: Acute   Code(s): E03.9 - HYPOTHYROIDISM, 

UNSPECIFIED   SNOMED Code(s): 21327494


   Comment: 


- TSH on low end of normal at 0.38, continue 125mcg daily


- Follow up outpatient.    





(3) DVT prophylaxis


Current Visit: No   Status: Acute   Code(s): Z29.9 - ENCOUNTER FOR PROPHYLACTIC 

MEASURES, UNSPECIFIED   SNOMED Code(s): 541589794


   Comment: 


-Continue lovenox.   





(4) Full code status


Current Visit: No   Status: Acute   Code(s): Z78.9 - OTHER SPECIFIED HEALTH 

STATUS   SNOMED Code(s): 050320244


   


Status and Disposition: 





Inpatient for respiratory failure, will discharge home when medically stable.

## 2020-04-21 VITALS — DIASTOLIC BLOOD PRESSURE: 69 MMHG | SYSTOLIC BLOOD PRESSURE: 121 MMHG

## 2020-04-21 RX ADMIN — CETIRIZINE HYDROCHLORIDE SCH MG: 10 TABLET, FILM COATED ORAL at 08:42

## 2020-04-21 RX ADMIN — GUAIFENESIN SCH MG: 600 TABLET, EXTENDED RELEASE ORAL at 08:43

## 2020-04-21 RX ADMIN — CEFEPIME HYDROCHLORIDE SCH MLS/HR: 2 INJECTION, POWDER, FOR SOLUTION INTRAVENOUS at 00:17

## 2020-04-21 RX ADMIN — DOXYCYCLINE SCH MLS/HR: 100 INJECTION, POWDER, LYOPHILIZED, FOR SOLUTION INTRAVENOUS at 03:09

## 2020-04-21 RX ADMIN — CEFEPIME HYDROCHLORIDE SCH MLS/HR: 2 INJECTION, POWDER, FOR SOLUTION INTRAVENOUS at 11:04

## 2020-04-21 RX ADMIN — PANTOPRAZOLE SODIUM SCH MG: 40 TABLET, DELAYED RELEASE ORAL at 08:42

## 2020-04-21 RX ADMIN — METHYLPREDNISOLONE SODIUM SUCCINATE SCH MG: 40 INJECTION, POWDER, FOR SOLUTION INTRAMUSCULAR; INTRAVENOUS at 05:16

## 2020-04-21 RX ADMIN — Medication SCH MG: at 08:42

## 2020-04-21 RX ADMIN — LEVOTHYROXINE SODIUM SCH MCG: 125 TABLET ORAL at 05:17

## 2020-04-21 NOTE — DS
CC:  DELROY Hoff*

 

DISCHARGE SUMMARY:

 

DATE OF ADMISSION:  04/15/20

 

DATE OF DISCHARGE:  04/21/20

 

PROVIDER:  DELROY Shah

 

ATTENDING PHYSICIAN WHILE IN THE HOSPITAL:  Dr. Junior Valentine* (dictated by 
DELROY Shah).

 

PRIMARY CARE PROVIDER:  DELROY Hoff

 

PRIMARY DIAGNOSIS:  Likely hypersensitivity pneumonitis.

 

SECONDARY DIAGNOSES:

1.  Hypothyroidism.

2.  History of thyroid cancer.

 

PERTINENT STUDIES WHILE IN THE HOSPITAL:  Chest x-ray on 04/15/20, radiologist'
s impression:  Right greater than left mid to lower lung zone airspace 
opacification.

 

Chest CT on 04/16/20, radiologist's impression:  Lung findings, interval 
worsening of patchy ground-glass opacification with an upper lobe and perihilar 
predominance. Mild dependent bibasilar atelectasis.  No interlobular septal 
thickening, nodules, honeycombing, consolidation, or masses.  No bronchiectasis
, peribronchial thickening, or luminal defects.

 

HISTORY OF PRESENT ILLNESS/HOSPITAL COURSE:  Hesham Llanos is a 65-year-old 
white male with past medical history significant for hypothyroidism and thyroid 
cancer, who presented to the emergency department due to shortness of breath 
after failing multiple outpatient treatments for presumed pneumonia.  For 
further details regarding his admission, please see history and physical 
written by DELROY Scott, on 04/15/20.  In brief, the patient has been 
receiving various outpatient therapies for presumed pneumonia since February of 
this year.  He has been having on and off fevers and chills and productive 
cough.  The patient was admitted to the hospital and initially started on 
cefepime and doxycycline for more broad-spectrum coverage while he was in the 
hospital due to presumed pneumonia, especially considering his chest x-ray 
findings and he was hypoxic as well requiring 10 L of oxygen initially.  He was 
not improving and Solu-Medrol was started and he did start to have lower oxygen 
requirements.  Due to his presentation and ongoing nature of shortness of 
breath since February, chest CT was ordered, which demonstrated ground-glass 
opacities.  For this reason, Dr. Younger of pulmonology service was consulted.  
She saw this patient and believed that his presentation is likely 
representative of hypersensitivity pneumonitis and recommended continuing the 
prednisone and trying a slow taper of 10 mg every week and to follow up with 
her in the clinic.  Ultimately, by day of discharge, the patient has completed 
a full course of cefepime and doxycycline for concern of any possible pneumonia 
and he was changed to 60 mg of prednisone after being on Solu-Medrol 40 mg 
b.i.d. for several days.  Ultimately, by day of discharge, he no longer was 
requiring oxygen. He was able to ambulate on room air and maintain oxygen 
saturation at 97%.  The patient was afebrile during his hospitalization and he 
did present with leukocytosis to 19,400, which is again the reason for 
antibiotics being used due to clinical concern for pneumonia, though this does 
seem less likely to be explanation for his ongoing symptoms.  He did have a 
thrombocytosis as well during his hospitalization, which may be representative 
of possible underlying infection in addition to the hypersensitivity pneumonitis
, but it is unclear and this should be followed outpatient.

 

The patient had CCP, COTY, and scleroderma antibody, which are negative and RF of

16.  He was tested for COVID-19, which was found to be negative as well.

 

PHYSICAL EXAM ON DAY OF DISCHARGE:  General:  A white male, sitting in chair, 
appearing comfortable, in no acute distress.  Eyes:  PERRL.  Sclerae anicteric. 
Lungs:  Clear to auscultation throughout.  Cardio:  Regular rate and rhythm 
without murmurs, rubs, or gallops.  Abdomen:  Nondistended.  Extremities:  No 
clubbing or cyanosis.  Neuro:  The patient is alert and oriented x3.

 

DISCHARGE PLAN:  The patient is to follow up with Dr. Younger in 2 weeks.  He is 
going to have a slow taper of oral prednisone.  He is going to need to follow 
up with his primary care provider within 1 to 2 weeks as well in addition to 
his followup with Dr. Younger to discuss this recent hospitalization.  The 
patient was advised to please return to the closest emergency department for 
shortness of breath, chest pain, fevers or chills, any other difficulty 
breathing, or other concerning symptoms.

 

DISCHARGE MEDICATIONS:

1.  Prednisone 60 mg x7 days, 50 mg x7 days, 40 mg x7 days, 30 mg x7 days, 20 
mg x7 days, 10 mg x7 days, and then discontinue.

2.  Mucinex 1200 mg p.o. b.i.d.

3.  Doxycycline 100 mg p.o. b.i.d. (x1 day).

4.  Tessalon 100 mg p.o. b.i.d. p.r.n. cough.

 

Continued home medications:

1.  Claritin 10 mg p.o. daily.

2.  Albuterol inhaler 2 puffs inhaled q.4 hours p.r.n. shortness of breath/
wheezing.

3.  Thiamine 100 mg p.o. daily.

4.  Levothyroxine 125 mcg p.o. daily.

 

CONDITION ON DISCHARGE:  Stable.

 

DISPOSITION:  Home.

 

TIME SPENT:  Approximately 35 minutes was spent on this discharge, 
approximately half this time was spent at bedside evaluating the patient and 
discussing the plan of care.

 

____________________________________ 

DELROY SHAH

 

[id number]/CPS #: [id number]

MTDD

## 2020-04-22 ENCOUNTER — HOSPITAL ENCOUNTER (EMERGENCY)
Dept: HOSPITAL 25 - ED | Age: 65
Discharge: ANOTHER HOSPITAL | End: 2020-04-22
Payer: COMMERCIAL

## 2020-04-22 VITALS — SYSTOLIC BLOOD PRESSURE: 113 MMHG | DIASTOLIC BLOOD PRESSURE: 70 MMHG

## 2020-04-22 DIAGNOSIS — E03.9: ICD-10-CM

## 2020-04-22 DIAGNOSIS — R47.81: ICD-10-CM

## 2020-04-22 DIAGNOSIS — I65.29: ICD-10-CM

## 2020-04-22 DIAGNOSIS — Z79.899: ICD-10-CM

## 2020-04-22 DIAGNOSIS — J18.9: ICD-10-CM

## 2020-04-22 DIAGNOSIS — R05: ICD-10-CM

## 2020-04-22 DIAGNOSIS — Z79.890: ICD-10-CM

## 2020-04-22 DIAGNOSIS — R53.1: ICD-10-CM

## 2020-04-22 DIAGNOSIS — I63.9: Primary | ICD-10-CM

## 2020-04-22 LAB
ALBUMIN SERPL BCG-MCNC: 3.5 G/DL (ref 3.2–5.2)
ALBUMIN/GLOB SERPL: 1.1 {RATIO} (ref 1–3)
ALP SERPL-CCNC: 56 U/L (ref 34–104)
ALT SERPL W P-5'-P-CCNC: 82 U/L (ref 7–52)
ANION GAP SERPL CALC-SCNC: 9 MMOL/L (ref 2–11)
APTT PPP: 28.4 SECONDS (ref 26–38)
AST SERPL-CCNC: 31 U/L (ref 13–39)
BASOPHILS # BLD AUTO: 0 10^3/UL (ref 0–0.2)
BUN SERPL-MCNC: 33 MG/DL (ref 6–24)
BUN/CREAT SERPL: 36.7 (ref 8–20)
CALCIUM SERPL-MCNC: 8.4 MG/DL (ref 8.6–10.3)
CHLORIDE SERPL-SCNC: 102 MMOL/L (ref 101–111)
CHOLEST SERPL-MCNC: 174 MG/DL
EOSINOPHIL # BLD AUTO: 0.4 10^3/UL (ref 0–0.6)
GLOBULIN SER CALC-MCNC: 3.2 G/DL (ref 2–4)
GLUCOSE SERPL-MCNC: 111 MG/DL (ref 70–100)
HCO3 SERPL-SCNC: 25 MMOL/L (ref 22–32)
HCT VFR BLD AUTO: 42 % (ref 42–52)
HDLC SERPL-MCNC: 49.7 MG/DL
HGB BLD-MCNC: 14.2 G/DL (ref 14–18)
INR PPP/BLD: 0.94 (ref 0.82–1.09)
LYMPHOCYTES # BLD AUTO: 1.7 10^3/UL (ref 1–4.8)
MCH RBC QN AUTO: 31 PG (ref 27–31)
MCHC RBC AUTO-ENTMCNC: 34 G/DL (ref 31–36)
MCV RBC AUTO: 93 FL (ref 80–94)
MONOCYTES # BLD AUTO: 1.3 10^3/UL (ref 0–0.8)
NEUTROPHILS # BLD AUTO: 28.1 10^3/UL (ref 1.5–7.7)
NRBC # BLD AUTO: 0 10^3/UL
NRBC BLD QL AUTO: 0
PLATELET # BLD AUTO: 546 10^3/UL (ref 150–450)
POTASSIUM SERPL-SCNC: 3.7 MMOL/L (ref 3.5–5)
PROT SERPL-MCNC: 6.7 G/DL (ref 6.4–8.9)
RBC # BLD AUTO: 4.55 10^6 /UL (ref 4.18–5.48)
SODIUM SERPL-SCNC: 136 MMOL/L (ref 135–145)
TRIGL SERPL-MCNC: 184 MG/DL
TROPONIN I SERPL-MCNC: 0 NG/ML (ref ?–0.03)
WBC # BLD AUTO: 31.5 10^3/UL (ref 3.5–10.8)

## 2020-04-22 PROCEDURE — 81003 URINALYSIS AUTO W/O SCOPE: CPT

## 2020-04-22 PROCEDURE — 70496 CT ANGIOGRAPHY HEAD: CPT

## 2020-04-22 PROCEDURE — 96361 HYDRATE IV INFUSION ADD-ON: CPT

## 2020-04-22 PROCEDURE — 84484 ASSAY OF TROPONIN QUANT: CPT

## 2020-04-22 PROCEDURE — 36415 COLL VENOUS BLD VENIPUNCTURE: CPT

## 2020-04-22 PROCEDURE — 71045 X-RAY EXAM CHEST 1 VIEW: CPT

## 2020-04-22 PROCEDURE — 99285 EMERGENCY DEPT VISIT HI MDM: CPT

## 2020-04-22 PROCEDURE — 80053 COMPREHEN METABOLIC PANEL: CPT

## 2020-04-22 PROCEDURE — 85730 THROMBOPLASTIN TIME PARTIAL: CPT

## 2020-04-22 PROCEDURE — 70450 CT HEAD/BRAIN W/O DYE: CPT

## 2020-04-22 PROCEDURE — 96365 THER/PROPH/DIAG IV INF INIT: CPT

## 2020-04-22 PROCEDURE — 70498 CT ANGIOGRAPHY NECK: CPT

## 2020-04-22 PROCEDURE — 85610 PROTHROMBIN TIME: CPT

## 2020-04-22 PROCEDURE — 85025 COMPLETE CBC W/AUTO DIFF WBC: CPT

## 2020-04-22 PROCEDURE — 93005 ELECTROCARDIOGRAM TRACING: CPT

## 2020-04-22 PROCEDURE — 83605 ASSAY OF LACTIC ACID: CPT

## 2020-04-22 PROCEDURE — 80061 LIPID PANEL: CPT
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
## 2020-04-22 NOTE — ED
Neurological HPI





- HPI Summary


HPI Summary: 





This patient is a 64 y/o male presenting to [facility] via EMS for left arm weakness 

and slurred speech since 0700 today when he woke up, last well known is at 2000 

last night 04/21/20. Patient was discharged from the hospital yesterday after 

being admitted for pneumonitis. He notes he went to bed at 2000 last night but 

he was coughing so he woke up and took coughing pills and his inhaler. Patient 

then went back to bed and laid there until this morning. Patient states at 0700 

today he noticed left arm weakness and slurred speech. Additionally notes as he 

was talking he felt numbness on the left side of his face. He currently is 

reporting "my left thumb is not working."  Denies fever, chest pain, SOB. Blood 

sugar is 66, per EMS. 


PMHx: hypothyroidism, thyroid CA.





 Home Medications











 Medication  Instructions  Recorded  Confirmed  Type


 


Levothyroxine TAB* [Synthroid 125 125 mcg PO 0600 #120 tab 03/09/20 04/22/20 Rx





MCG TAB*]    


 


Albuterol HFA INHALER* [Ventolin 2 puff INH Q4H PRN 30 Days #1 mdi 03/15/20 04/

22/20 Rx





HFA Inhaler*]    


 


Loratadine [Claritin] 10 mg PO DAILY 03/19/20 04/22/20 History


 


Thiamine Mononitrate (Vit B1) 100 mg PO DAILY 04/15/20 04/22/20 History





[Vitamin B-1]    


 


Benzonatate CAP* [Tessalon 100  mg PO BID PRN #28 cap 04/20/20 04/22/20 Rx





CAP*]    


 


guaiFENesin ER TAB [Mucinex*] 1,200 mg PO BID #30 tab.er 04/20/20 04/22/20 Rx


 


predniSONE 10 mg TAB [Deltasone 10 10 mg PO DAILY #147 tab 04/20/20 04/22/20 Rx





MG TAB*]    














- History of Current Complaint


Stated Complaint: LEFT SIDED WEAKNESS PER EMS


Time Seen by Provider: 04/22/20 08:46


Hx Obtained From: Patient


Onset/Duration: Started hours ago, Still Present


Timing: Sudden Onset


Current Severity: Mild


Neurological Deficit Location: Facial, LUE


Pain Intensity: 0


Pain Scale Used: 0-10 Numeric


Character: Weak - left arm, Numbness/Tingling - left face numbness, Impaired 

Speech - Slurred


Aggravating: Nothing


Alleviating: Nothing


Associated Signs and Symptoms: Positive: Weakness, Impaired Speech - Slurred, 

Numbness.  Negative: Loss of Consciousness, Pain, Fever, Chest Pain, Shortness 

of Breath





- Additional Pertinent History


Primary Care Physician: SHARMILA





- Allergy/Home Medications


Allergies/Adverse Reactions: 


 Allergies











Allergy/AdvReac Type Severity Reaction Status Date / Time


 


No Known Allergies Allergy   Verified 04/22/20 09:00











Home Medications: 


 Home Medications





Levothyroxine TAB* [Synthroid 125 MCG TAB*] 125 mcg PO 0600 #120 tab 03/09/20 [

Rx Confirmed 04/22/20]


Albuterol HFA INHALER* [Ventolin HFA Inhaler*] 2 puff INH Q4H PRN 30 Days #1 

mdi 03/15/20 [Rx Confirmed 04/22/20]


Loratadine [Claritin] 10 mg PO DAILY 03/19/20 [History Confirmed 04/22/20]


Thiamine Mononitrate (Vit B1) [Vitamin B-1] 100 mg PO DAILY 04/15/20 [History 

Confirmed 04/22/20]


Benzonatate CAP* [Tessalon 100 MG CAP*] 100 mg PO BID PRN #28 cap 04/20/20 [Rx 

Confirmed 04/22/20]


guaiFENesin ER TAB [Mucinex*] 1,200 mg PO BID #30 tab.er 04/20/20 [Rx Confirmed 

04/22/20]


predniSONE 10 mg TAB [Deltasone 10 MG TAB*] 10 mg PO DAILY #147 tab 04/20/20 [

Rx Confirmed 04/22/20]











PMH/Surg Hx/FS Hx/Imm Hx


Endocrine/Hematology History: Reports: Hx Thyroid Disease


   Denies: Hx Diabetes


Cardiovascular History: 


   Denies: Hx Deep Vein Thrombosis, Hx Hypertension


Respiratory History: Reports: Hx Pneumonia


   Denies: Hx Asthma, Hx Chronic Obstructive Pulmonary Disease (COPD), Hx 

Pulmonary Edema


Sensory History: 


   Denies: Hx Contacts or Glasses, Hx Hearing Aid


Opthamlomology History: 


   Denies: Hx Contacts or Glasses





- Cancer History


Cancer Type, Location and Year: thyroid





- Surgical History


Surgery Procedure, Year, and Place: tonsillectomy, corrective eye surgery





- Family History


Known Family History: Positive: Cardiac Disease





- Social History


Alcohol Use: Rare


Alcohol Amount: 5 beers a day


Hx Substance Use: No


Substance Use Type: Reports: None


Hx Tobacco Use: No


Smoking Status (MU): Never Smoked Tobacco





Review of Systems


Negative: Fever


Negative: Chest Pain


Positive: Cough.  Negative: Shortness Of Breath


Positive: Weakness - left arm, Numbness - on left side of face, Slurred Speech


All Other Systems Reviewed And Are Negative: Yes





Physical Exam





- Summary


Physical Exam Summary: 





VITAL SIGNS: Reviewed.


GENERAL: Patient is a well-developed and nourished male who is lying 

comfortable in the stretcher. Patient is not in any acute respiratory distress. 


HEAD AND FACE: No signs of trauma. No ecchymosis, hematomas or skull 

depressions. No sinus tenderness.


EYES: PERRLA, EOMI x 2, No injected conjunctiva, no nystagmus. No photophobia.


EARS: Hearing grossly intact. Ear canals and tympanic membranes are within 

normal limits. 


MOUTH: Oropharynx within normal limits. 


NECK: Supple, trachea is midline, no adenopathy, no JVD, no carotid bruit, no c-

spine tenderness, neck with full ROM. No meningeal signs, no Kernig's or 

brudzinskis signs. 


CHEST: Symmetric, no tenderness at palpation. 


LUNGS: Clear to auscultation bilaterally. No wheezing or crackles.


CVS: Regular rate and rhythm, S1 and S2 present, no murmurs or gallops 

appreciated. 


ABDOMEN: Soft, non-tender. No signs of distention. No rebound, no guarding, and 

no masses palpated. Bowel sounds are normal. 


EXTREMITIES: FROM in all major joints, no edema, no cyanosis or clubbing.


NEURO: Alert and oriented x 3. Patient has slurred speech. Left upper extremity 

weakness and decreased sensation. Facial fold on the right side is not present. 

Facial fold on the left side is present. Decreased sensation on the right side 

of face.


SKIN: Dry and warm.


GCS: 15


Triage Information Reviewed: Yes


Vital Signs On Initial Exam: 





 Initial Vitals











Temp Pulse Resp BP Pulse Ox


 


 98.9 F   96   20   122/76   94 


 


 04/22/20 08:48  04/22/20 08:48  04/22/20 08:48  04/22/20 08:48  04/22/20 08:48








Vital Signs Reviewed: Yes





Procedures





- Sedation


Patient Received Moderate/Deep Sedation with Procedure: No





Diagnostics





- Laboratory


Result Diagrams: 


 04/22/20 09:05





 04/22/20 09:05


Lab Statement: Any lab studies that have been ordered have been reviewed, and 

results considered in the medical decision making process.





- Radiology


  ** Chest XR


Radiology Interpretation Completed By: Radiologist


Summary of Radiographic Findings: IMPRESSION: Patchy bibasilar atelectasis 

versus early consolidation. Dr. Pedraza has reviewed this report.





- CT


  ** Brain CT


CT Interpretation Completed By: Radiologist


Summary of CT Findings: IMPRESSION: LOSS OF GRAY-WHITE DIFFERENTIATION ALONG 

THE LEFT INSULAR STRIPE CONCERNING FOR EARLY NONHEMORRHAGIC LEFT MCA TERRITORY 

INFARCT. PRELIMINARY FINDINGS WERE DISCUSSED WITH DR. PEDRAZA AT APPROXIMATELY 9:

06 AM ON APRIL 22, 2020.





  ** Head/Neck CTA


CT Interpretation Completed By: Radiologist


Summary of CT Findings: IMPRESSION: 1. ATHEROMATOUS DISEASE, WITH SOFT, 

POTENTIALLY UNSTABLE PLAQUE OF THE RIGHT INTERNAL CAROTID ARTERY RESULTING IN 

APPROXIMATELY 50% STENOSIS BY NASCET CRITERIA. THERE IS NO LEFT INTERNAL 

CAROTID ARTERY STENOSIS BY NASCET CRITERIA. 2. NO ANEURYSM, VASCULAR 

MALFORMATION, OCCLUSION, OR STENOSIS OF THE VISUALIZED INTRACRANIAL 

CIRCULATION. 3. STATUS POST LEFT NECK DISSECTION WITH ABSENCE OF THE LEFT 

INTERNAL JUGULAR VEIN AND CHRONIC APPEARING NONOCCLUSIVE THROMBUS OF THE LEFT 

SIGMOID SINUS. PRELIMINARY FINDINGS WERE DISCUSSED WITH DR. PEDRAZA AT 

APPROXIMATELY 0958 ON APRIL 22, 2020.





- EKG


  ** 0928


Cardiac Rate: NL - at 90 bpm


EKG Rhythm: Sinus Rhythm


Summary of EKG Findings: EKG at 0928 shows sinus rhythm at a rate of 90 bpm. No 

ST elevations. Normal axis. This EKG was reviewed and interpreted by ED 

physician.





NIH Scale





- NIH Scale


Level of Consciousness: Alert/Keenly Responsive


Ask Patient the Month and His/Her Age: Both Correct


Ask Pt to Open/Close Eyes and /Release Non-Paretic Hand: Both Correctly


Best Gaze (Only Horizontal Eye Movement): Normal


Visual Field Testing: No Visual Loss


Facial Paresis-Pt to Smile & Close Eyes or Grimace Symmetry: Minor Paralysis


Motor Function - Right Arm: No Drift-Holds 10 Seconds


Motor Function - Left Arm: Drifts LT 10 seconds


Motor Function - Right Leg: Drifts LT 10 seconds


Motor Function - Left Leg: No Drift-Holds 10 Seconds


Limb Ataxia-Must be out of Proportion to Weakness Present: Absent


Sensory (Use Pinprick to Test Arms/Legs/Trunk/Face): Pinprick Less on Affected


Best Language (Describe Picture, Name Items): No Aphasia


Dysarthria (Read Several Words): Slurs Some Words


Extinction and Inattention: No Abnormality


Total Score: 5





Course/Dx





- Course


Course Of Treatment: Code Grey overhead called at 0844 from ambulance bay. 

Patient arrives to the ED via EMS at 0845 and Dr. Pedraza immediately at bedside. 

Patient to CT at 0846. Patient returned from CT at 0857 with Dr. Vidal, 

neurologist, at bedside. Dr. Fierro, radiologist, reports brain CT results at 

0904. CTA ordered as recommended by Dr. Vidal at 0904. Dr. Vidal reports patient 

is not a candidate for TPA at 0910 because her symptoms began yesterday at 

2000. Dr. Fierro, radiologist, reports CTA results at 0956. Dr. Vidal, 

neurologist, recommends transfer to [facility] at 10:10 for 

CVA with potentially unstable plaque on the right internal carotid artery.


Assessment/Plan: This patient is a 64 y/o male presenting to [facility] via EMS for 

left arm weakness and slurred speech since 0700 today when he woke up. Last 

well known 8 PM yesterday. Patient's last well known is at 2000 last night 04/21 /20 when he went to bed but began to notice symptoms when he woke up today at 

0700. Patient was discharged from the hospital yesterday after being admitted 

for pneumonitis. He notes he went to bed at 2000 last night but he was coughing 

so he woke up and took coughing pills and his inhaler. Patient then went back 

to bed and laid there until this morning. Patient states at 0700 today he 

noticed left arm weakness and slurred speech. Additionally notes as he was 

talking he felt numbness on the left side of his face. He is currently 

reporting "my left thumb, left hand is not working."  Denies fever, chest pain, 

SOB. Blood sugar is 66, per EMS.  PMHx: hypothyroidism, thyroid CA.  Code grey 

was called at 8:44 AM.  Patient is still having some slurred speech and left 

upper extremity weakness.  NIH is equal to 5.  GCS is 15.  In the ED course the 

patient was placed in a cardiac monitor, IV access was obtained, IV fluids were 

started.  Past medical records reviewed.  Past medical history significant for 

pneumonitis, hypothyroidism, thyroid cancer.  Dr. Vidal, neurologist, at 

bedside.  Symptoms started at 8 PM last night as per patient. Dr. Vidal 

recommends no tPA.  Blood test w/o a significant abnormality except for WBC 31.5

, platelets 546, absolute neutrophils 28.1, BUN 33, glucose 111, calcium 8.4, 

ALT 82.  Head CT IMPRESSION:  LOSS OF GRAY-WHITE DIFFERENTIATION ALONG THE LEFT 

INSULAR STRIPE CONCERNING FOR EARLY NONHEMORRHAGIC LEFT MCA TERRITORY INFARCT.  

Dr. Vidal request a CTA head and neck.  CTA head and neck IMPRESSION:  1. 

ATHEROMATOUS DISEASE, WITH SOFT, POTENTIALLY UNSTABLE PLAQUE OF THE RIGHT 

INTERNAL.  CAROTID ARTERY RESULTING IN APPROXIMATELY 50% STENOSIS BY NASCET 

CRITERIA. THERE IS NO.  LEFT INTERNAL CAROTID ARTERY STENOSIS BY NASCET 

CRITERIA.  2. NO ANEURYSM, VASCULAR MALFORMATION, OCCLUSION, OR STENOSIS OF THE 

VISUALIZED.  INTRACRANIAL CIRCULATION.  3. STATUS POST LEFT NECK DISSECTION 

WITH ABSENCE OF THE LEFT INTERNAL JUGULAR VEIN AND CHRONIC APPEARING 

NONOCCLUSIVE THROMBUS OF THE LEFT SIGMOID SINUS.  CXR IMPRESSION: PATCHY 

BIBASILAR ATELECTASIS VERSUS EARLY CONSOLIDATION.  I discussed the case with 

Dr. Strong vascular surgeon from [facility] and she recommends 

medical management with aspirin, Plavix, and statin.  I discussed the case 

again with Dr. Vidal from neurology and he does not agree with the suggestion 

and he recommends to continue seeking transfer either to [facility] 

or [facility].  At approximately 1:15 PM I discussed the case with Dr. Pearce 

neurocritical and she accepted the patient for transfer to [facility].  She stated that she will return the patient for COVID-19.  Patient was 

given Cefepime.  At this time the patient is hemodynamically stable, alert and 

oriented 3.  Patient will be transferred to [facility].





- Diagnoses


Provider Diagnoses: 


 CVA (cerebral vascular accident), Carotid artery plaque, Pneumonia





During the Visit The Following Alert/Code Occurred: Code Grey - at 0844





- Physician Notifications


Discussed Care Of Patient With: Russell Fierro


Time Discussed With Above Provider: 09:04


Instructed by Provider To: Other - Dr. Fierro, radiologist, reports brain CT 

results. [0956] Dr. Fierro reports Head/Neck CTA. [10:10] Dr. Vidal, neurologist

, recommends trasferring patient to [facility]. [10:31] Spoke with the 

transfer center and they will call back with vascular surgery. [11:59] 

Discussed the case with Dr. Strong, vascular surgeon from [facility], 

who reports there is only 50% stenosis and the patient does not need surgical 

treatment and can be medically treated with Plavix, aspirin, and statin. [12:05

] Dr. iVdal, neurologist, would like to keep seeking transfer to either [facility] or [facility]. [1305] Discussed the case with Dr. Pearce, neurocrit 

from [facility], who accepted the patient for transfer.





- Critical Care Time


Critical Care Time:  min


Critical Care Statement: Critical care time is provided exclusive of any time 

spent performing procedures.





Discharge ED





- Sign-Out/Discharge


Documenting (check all that apply): Patient Departure - TRANSFER TO [facility] 





- Discharge Plan


Condition: Stable


Disposition: TRANS HIGHER LVL OF CARE FAC


Referrals: 


Ahsan Townsend PA [Primary Care Provider] - 





- Billing Disposition and Condition


Condition: STABLE


Disposition: Trans Higher Lvl of Care Fac





- Attestation Statements


Document Initiated by Scribe: Yes


Documenting Scribe: Rosalinda Lambert


Provider For Whom Scribe is Documenting (Include Credential): Clarence Pedraza MD


Scribe Attestation: 


Rosalinda TIDWELL, scribed for Clarence Pedraza MD on 04/22/20 at 1354. 


Scribe Documentation Reviewed: Yes


Provider Attestation: 


The documentation as recorded by the Rsoalinda manning accurately reflects 

the service I personally performed and the decisions made by me, Clarence Pedraza MD


Status of Scribe Document: Viewed